# Patient Record
Sex: FEMALE | Race: WHITE
[De-identification: names, ages, dates, MRNs, and addresses within clinical notes are randomized per-mention and may not be internally consistent; named-entity substitution may affect disease eponyms.]

---

## 2017-05-23 ENCOUNTER — HOSPITAL ENCOUNTER (EMERGENCY)
Dept: HOSPITAL 8 - ED | Age: 27
LOS: 1 days | Discharge: HOME | End: 2017-05-24
Payer: MEDICAID

## 2017-05-23 VITALS — DIASTOLIC BLOOD PRESSURE: 62 MMHG | SYSTOLIC BLOOD PRESSURE: 117 MMHG

## 2017-05-23 DIAGNOSIS — J02.0: Primary | ICD-10-CM

## 2017-05-23 PROCEDURE — 99283 EMERGENCY DEPT VISIT LOW MDM: CPT

## 2018-03-13 ENCOUNTER — HOSPITAL ENCOUNTER (EMERGENCY)
Facility: MEDICAL CENTER | Age: 28
End: 2018-03-13
Attending: EMERGENCY MEDICINE
Payer: MEDICAID

## 2018-03-13 VITALS
WEIGHT: 176.59 LBS | SYSTOLIC BLOOD PRESSURE: 134 MMHG | DIASTOLIC BLOOD PRESSURE: 81 MMHG | HEART RATE: 83 BPM | RESPIRATION RATE: 16 BRPM | TEMPERATURE: 98.4 F | OXYGEN SATURATION: 95 %

## 2018-03-13 DIAGNOSIS — N30.00 ACUTE CYSTITIS WITHOUT HEMATURIA: ICD-10-CM

## 2018-03-13 LAB
APPEARANCE UR: CLEAR
BACTERIA #/AREA URNS HPF: ABNORMAL /HPF
BILIRUB UR QL STRIP.AUTO: NEGATIVE
CAOX CRY #/AREA URNS HPF: ABNORMAL /HPF
COLOR UR: YELLOW
CULTURE IF INDICATED INDCX: YES UA CULTURE
EPI CELLS #/AREA URNS HPF: ABNORMAL /HPF
GLUCOSE UR STRIP.AUTO-MCNC: NEGATIVE MG/DL
HCG UR QL: NEGATIVE
KETONES UR STRIP.AUTO-MCNC: ABNORMAL MG/DL
LEUKOCYTE ESTERASE UR QL STRIP.AUTO: ABNORMAL
MICRO URNS: ABNORMAL
NITRITE UR QL STRIP.AUTO: POSITIVE
PH UR STRIP.AUTO: 6 [PH]
PROT UR QL STRIP: NEGATIVE MG/DL
RBC # URNS HPF: ABNORMAL /HPF
RBC UR QL AUTO: ABNORMAL
SP GR UR REFRACTOMETRY: 1.02
SP GR UR STRIP.AUTO: 1.02
UROBILINOGEN UR STRIP.AUTO-MCNC: 0.2 MG/DL
WBC #/AREA URNS HPF: ABNORMAL /HPF

## 2018-03-13 PROCEDURE — A9270 NON-COVERED ITEM OR SERVICE: HCPCS | Performed by: EMERGENCY MEDICINE

## 2018-03-13 PROCEDURE — 81025 URINE PREGNANCY TEST: CPT

## 2018-03-13 PROCEDURE — 87077 CULTURE AEROBIC IDENTIFY: CPT

## 2018-03-13 PROCEDURE — 87186 SC STD MICRODIL/AGAR DIL: CPT

## 2018-03-13 PROCEDURE — 700102 HCHG RX REV CODE 250 W/ 637 OVERRIDE(OP): Performed by: EMERGENCY MEDICINE

## 2018-03-13 PROCEDURE — 99284 EMERGENCY DEPT VISIT MOD MDM: CPT

## 2018-03-13 PROCEDURE — 87086 URINE CULTURE/COLONY COUNT: CPT

## 2018-03-13 PROCEDURE — 81001 URINALYSIS AUTO W/SCOPE: CPT

## 2018-03-13 RX ORDER — SULFAMETHOXAZOLE AND TRIMETHOPRIM 800; 160 MG/1; MG/1
1 TABLET ORAL 2 TIMES DAILY
Qty: 10 TAB | Refills: 0 | Status: SHIPPED | OUTPATIENT
Start: 2018-03-13 | End: 2018-03-18

## 2018-03-13 RX ORDER — SULFAMETHOXAZOLE AND TRIMETHOPRIM 800; 160 MG/1; MG/1
1 TABLET ORAL ONCE
Status: COMPLETED | OUTPATIENT
Start: 2018-03-13 | End: 2018-03-13

## 2018-03-13 RX ADMIN — SULFAMETHOXAZOLE AND TRIMETHOPRIM 1 TABLET: 800; 160 TABLET ORAL at 19:57

## 2018-03-14 NOTE — ED TRIAGE NOTES
Patient states 2 days hx of worsening dysuria, frequency, and hesitancy. Patient denies fever/chills. Patient states unknown if she is pregnant, but does not think so. Patient states she has very irregular menses, but denies contraception use.

## 2018-03-14 NOTE — ED PROVIDER NOTES
ED Provider Note    Scribed for Ines Tate M.D. by Rosa Dorado. 3/13/2018, 7:33 PM.    Primary Care Provider: Pcp Pt States None  Means of arrival: walk in   History obtained from: patient   History limited by: none     CHIEF COMPLAINT  Chief Complaint   Patient presents with   • Painful Urination       HPI  Mae Wells is a 27 y.o. female who presents to the Emergency Department for evaluation of frequent urination onset 2 days ago. Patient confirms subjective fevers onset 5 days ago. She denies nausea and vomiting.       REVIEW OF SYSTEMS  Pertinent positives include frequent urination.   Pertinent negatives include no nausea, vomiting.    E.       PAST MEDICAL HISTORY  None noted       SOCIAL HISTORY  None noted       SURGICAL HISTORY  patient denies any surgical history       CURRENT MEDICATIONS  Home Medications    **Home medications have not yet been reviewed for this encounter**         ALLERGIES  No Known Allergies        PHYSICAL EXAM  VITAL SIGNS: /75   Pulse (!) 103   Temp 37.3 °C (99.2 °F) (Temporal)   Resp 16   Wt 80.1 kg (176 lb 9.4 oz)   SpO2 100%   Constitutional: Alert in no apparent distress. Well apearing  HENT: Normocephalic, Atraumatic, Bilateral external ears normal. Nose normal.   Eyes:  Conjunctiva normal, non-icteric.   Lungs: Non-labored respirations  Abdomen: Soft nontender nondistended. No CVA tenderness  Skin: Warm, Dry, No erythema, No rash.   Neurologic: Alert, Grossly non-focal.   Psychiatric: Affect normal, Judgment normal, Mood normal, Appears appropriate and not intoxicated.         LABS  Labs Reviewed   URINALYSIS,CULTURE IF INDICATED - Abnormal; Notable for the following:        Result Value    Ketones Trace (*)     Nitrite Positive (*)     Leukocyte Esterase Moderate (*)     Occult Blood Trace (*)     All other components within normal limits   URINE MICROSCOPIC (W/UA) - Abnormal; Notable for the following:     WBC  (*)     Bacteria  Many (*)     All other components within normal limits   HCG QUALITATIVE UR   REFRACTOMETER SG   URINE CULTURE(NEW)   All labs reviewed by me.        COURSE & MEDICAL DECISION MAKING  Pertinent Labs & Imaging studies reviewed. (See chart for details)    7:33 PM - Patient seen and examined at bedside. Patient will be treated with Bactrim 800-160 mg. Ordered Beta HCG Qualitative, urine microscopic, UA, refractometer and urine culture to evaluate her symptoms. Her urinalysis is consistent with UTI. She'll be given antibiotics. Patient agreed to discharge home with a prescription for an antibiotic. Encouraged Tylenol and Motrin for pain control.       The patient will return for new or worsening symptoms and is stable at the time of discharge. Patient was given return precautions. Patient and/or family member verbalizes understanding and will comply.      DISPOSITION:  Patient will be discharged home in stable condition.      FOLLOW UP:  [facility], Emergency Dept  [address] [facility] [address] [phone]    Return for worsening pain, vomiting, fever or other concerns      OUTPATIENT MEDICATIONS:  New Prescriptions    SULFAMETHOXAZOLE-TRIMETHOPRIM (BACTRIM DS) 800-160 MG TABLET    Take 1 Tab by mouth 2 times a day for 5 days.         FINAL IMPRESSION  1. Acute cystitis without hematuria        This dictation has been created using voice recognition software and/or scribes. The accuracy of the dictation is limited by the abilities of the software and the expertise of the scribes. I expect there may be some errors of grammar and possibly content. I made every attempt to manually correct the errors within my dictation. However, errors related to voice recognition software and/or scribes may still exist and should be interpreted within the appropriate context.    I, Rosa Dorado (Scribarmani), am scribing for, and in the presence of, Ines Tate M.D..  Electronically signed by: Rosa GARCIA  Estrada (Scribarmani), 3/13/2018  IInes M.D. personally performed the services described in this documentation, as scribed by Rosa Dorado in my presence, and it is both accurate and complete.    The note accurately reflects work and decisions made by me.  Ines Tate  3/13/2018  10:42 PM

## 2018-03-14 NOTE — ED NOTES
Patient dc'd to home w/ self. Patient alert and oriented x 4. Patient ambulatory w/ steady gait. Patient verbalizes understating of discharge instructions, follow up care, and prescription x 1.

## 2018-03-16 LAB
BACTERIA UR CULT: ABNORMAL
BACTERIA UR CULT: ABNORMAL
SIGNIFICANT IND 70042: ABNORMAL
SITE SITE: ABNORMAL
SOURCE SOURCE: ABNORMAL

## 2018-03-19 NOTE — ED NOTES
ED Positive Culture Follow-up/Notification Note:    Date: 3/19/18     Patient seen in the ED on 3/13/2018 for frequent urination x 2 days.   1. Acute cystitis without hematuria       Discharge Medication List as of 3/13/2018  7:59 PM      START taking these medications    Details   sulfamethoxazole-trimethoprim (BACTRIM DS) 800-160 MG tablet Take 1 Tab by mouth 2 times a day for 5 days., Disp-10 Tab, R-0, Print Rx Paper             Allergies: Patient has no known allergies.     Vitals:    03/13/18 1802 03/13/18 1810 03/13/18 2002   BP: 138/75  134/81   Pulse: (!) 103  83   Resp: 16  16   Temp: 37.3 °C (99.2 °F)  36.9 °C (98.4 °F)   TempSrc: Temporal     SpO2: 100%  95%   Weight:  80.1 kg (176 lb 9.4 oz)        Final cultures:   Results     Procedure Component Value Units Date/Time    URINE CULTURE(NEW) [[id number]]  (Abnormal)  (Susceptibility) Collected:  03/13/18 1925    Order Status:  Completed Specimen:  Urine Updated:  03/16/18 1008     Significant Indicator POS (POS)     Source UR     Site --     Urine Culture Mixed skin rafael 10-50,000 cfu/mL (A)      Escherichia coli  >100,000 cfu/mL   (A)    Culture & Susceptibility     ESCHERICHIA COLI     Antibiotic Sensitivity Microscan Unit Status    Ampicillin Resistant >16 mcg/mL Final    Cefepime Sensitive <=8 mcg/mL Final    Cefotaxime Sensitive <=2 mcg/mL Final    Cefotetan Sensitive <=16 mcg/mL Final    Ceftazidime Sensitive <=1 mcg/mL Final    Ceftriaxone Sensitive <=8 mcg/mL Final    Cefuroxime Sensitive <=4 mcg/mL Final    Cephalothin Intermediate 16 mcg/mL Final    Ciprofloxacin Sensitive <=1 mcg/mL Final    Gentamicin Sensitive <=4 mcg/mL Final    Levofloxacin Sensitive <=2 mcg/mL Final    Nitrofurantoin Sensitive <=32 mcg/mL Final    Pip/Tazobactam Sensitive <=16 mcg/mL Final    Piperacillin Sensitive <=16 mcg/mL Final    Tigecycline Sensitive <=2 mcg/mL Final    Tobramycin Sensitive <=4 mcg/mL Final    Trimeth/Sulfa Sensitive <=2/38 mcg/mL Final                        URINALYSIS,CULTURE IF INDICATED [[id number]]  (Abnormal) Collected:  03/13/18 1925    Order Status:  Completed Specimen:  Urine from Urine, Clean Catch Updated:  03/13/18 1932     Color Yellow     Character Clear     Specific Gravity 1.021     Ph 6.0     Glucose Negative mg/dL      Ketones Trace (A) mg/dL      Protein Negative mg/dL      Bilirubin Negative     Urobilinogen, Urine 0.2     Nitrite Positive (A)     Leukocyte Esterase Moderate (A)     Occult Blood Trace (A)     Micro Urine Req Microscopic     Culture Indicated Yes UA Culture           Plan:   Appropriate antibiotic therapy prescribed. No changes required based upon culture result.      Devora Younger

## 2018-03-24 ENCOUNTER — HOSPITAL ENCOUNTER (INPATIENT)
Dept: HOSPITAL 8 - ED | Age: 28
LOS: 1 days | Discharge: HOME | DRG: 917 | End: 2018-03-25
Attending: INTERNAL MEDICINE | Admitting: INTERNAL MEDICINE
Payer: MEDICAID

## 2018-03-24 VITALS — DIASTOLIC BLOOD PRESSURE: 80 MMHG | SYSTOLIC BLOOD PRESSURE: 123 MMHG

## 2018-03-24 VITALS — WEIGHT: 182.76 LBS | HEIGHT: 64 IN | BODY MASS INDEX: 31.2 KG/M2

## 2018-03-24 VITALS — DIASTOLIC BLOOD PRESSURE: 80 MMHG | SYSTOLIC BLOOD PRESSURE: 127 MMHG

## 2018-03-24 VITALS — SYSTOLIC BLOOD PRESSURE: 118 MMHG | DIASTOLIC BLOOD PRESSURE: 79 MMHG

## 2018-03-24 DIAGNOSIS — T40.1X1A: Primary | ICD-10-CM

## 2018-03-24 DIAGNOSIS — D72.825: ICD-10-CM

## 2018-03-24 DIAGNOSIS — E86.0: ICD-10-CM

## 2018-03-24 DIAGNOSIS — F17.200: ICD-10-CM

## 2018-03-24 DIAGNOSIS — D72.829: ICD-10-CM

## 2018-03-24 DIAGNOSIS — F60.3: ICD-10-CM

## 2018-03-24 DIAGNOSIS — F32.9: ICD-10-CM

## 2018-03-24 DIAGNOSIS — F11.10: ICD-10-CM

## 2018-03-24 DIAGNOSIS — F41.9: ICD-10-CM

## 2018-03-24 DIAGNOSIS — J96.01: ICD-10-CM

## 2018-03-24 LAB
<PLATELET ESTIMATE>: ADEQUATE
<PLT MORPHOLOGY>: (no result)
ALBUMIN SERPL-MCNC: 4.2 G/DL (ref 3.4–5)
ANION GAP SERPL CALC-SCNC: 10 MMOL/L (ref 5–15)
APAP SERPL-MCNC: < 2 MCG/ML (ref 10–30)
BAND#(MANUAL): 2.31 X10^3/UL
BILIRUB DIRECT SERPL-MCNC: NORMAL MG/DL
CALCIUM SERPL-MCNC: 8.8 MG/DL (ref 8.5–10.1)
CHLORIDE SERPL-SCNC: 105 MMOL/L (ref 98–107)
CREAT SERPL-MCNC: 0.74 MG/DL (ref 0.55–1.02)
CULTURE INDICATED?: NO
ERYTHROCYTE [DISTWIDTH] IN BLOOD BY AUTOMATED COUNT: 12.8 % (ref 9.6–15.2)
LYMPH#(MANUAL): 4.39 X10^3/UL (ref 1–3.4)
LYMPHS% (MANUAL): 19 % (ref 22–44)
MCH RBC QN AUTO: 32.3 PG (ref 27–34.8)
MCHC RBC AUTO-ENTMCNC: 34.5 G/DL (ref 32.4–35.8)
MCV RBC AUTO: 93.7 FL (ref 80–100)
MD: YES
MICROSCOPIC: (no result)
MONOS#(MANUAL): 0.46 X10^3/UL (ref 0.3–2.7)
MONOS% (MANUAL): 2 % (ref 2–9)
NEUTS BAND NFR BLD: 10 % (ref 0–7)
PLATELET # BLD AUTO: 308 X10^3/UL (ref 130–400)
PMV BLD AUTO: 7.7 FL (ref 7.4–10.4)
RBC # BLD AUTO: 5.28 X10^6/UL (ref 3.82–5.3)
SEG#(MANUAL): 15.94 X10^3/UL (ref 1.8–6.8)
SEGS% (MANUAL): 69 % (ref 42–75)
VANCOMYCIN TROUGH SERPL-MCNC: < 1.7 MG/DL (ref 2.8–20)

## 2018-03-24 PROCEDURE — 84703 CHORIONIC GONADOTROPIN ASSAY: CPT

## 2018-03-24 PROCEDURE — 36415 COLL VENOUS BLD VENIPUNCTURE: CPT

## 2018-03-24 PROCEDURE — 83605 ASSAY OF LACTIC ACID: CPT

## 2018-03-24 PROCEDURE — 71045 X-RAY EXAM CHEST 1 VIEW: CPT

## 2018-03-24 PROCEDURE — 84145 PROCALCITONIN (PCT): CPT

## 2018-03-24 PROCEDURE — 80329 ANALGESICS NON-OPIOID 1 OR 2: CPT

## 2018-03-24 PROCEDURE — 87040 BLOOD CULTURE FOR BACTERIA: CPT

## 2018-03-24 PROCEDURE — 82040 ASSAY OF SERUM ALBUMIN: CPT

## 2018-03-24 PROCEDURE — 85025 COMPLETE CBC W/AUTO DIFF WBC: CPT

## 2018-03-24 PROCEDURE — 81003 URINALYSIS AUTO W/O SCOPE: CPT

## 2018-03-24 PROCEDURE — G0480 DRUG TEST DEF 1-7 CLASSES: HCPCS

## 2018-03-24 PROCEDURE — 96374 THER/PROPH/DIAG INJ IV PUSH: CPT

## 2018-03-24 PROCEDURE — 80053 COMPREHEN METABOLIC PANEL: CPT

## 2018-03-24 PROCEDURE — 80307 DRUG TEST PRSMV CHEM ANLYZR: CPT

## 2018-03-24 PROCEDURE — 80048 BASIC METABOLIC PNL TOTAL CA: CPT

## 2018-03-24 RX ADMIN — PROPRANOLOL HYDROCHLORIDE SCH MG: 20 TABLET ORAL at 10:17

## 2018-03-24 RX ADMIN — NICOTINE SCH PATCH: 14 PATCH, EXTENDED RELEASE TRANSDERMAL at 12:02

## 2018-03-24 RX ADMIN — PROPRANOLOL HYDROCHLORIDE SCH MG: 20 TABLET ORAL at 21:03

## 2018-03-24 RX ADMIN — NICOTINE SCH PATCH: 14 PATCH, EXTENDED RELEASE TRANSDERMAL at 08:30

## 2018-03-25 VITALS — SYSTOLIC BLOOD PRESSURE: 118 MMHG | DIASTOLIC BLOOD PRESSURE: 73 MMHG

## 2018-03-25 VITALS — SYSTOLIC BLOOD PRESSURE: 103 MMHG | DIASTOLIC BLOOD PRESSURE: 65 MMHG

## 2018-03-25 LAB
ALBUMIN SERPL-MCNC: 3.3 G/DL (ref 3.4–5)
ALP SERPL-CCNC: 93 U/L (ref 45–117)
ALT SERPL-CCNC: 31 U/L (ref 12–78)
ANION GAP SERPL CALC-SCNC: 6 MMOL/L (ref 5–15)
BASOPHILS # BLD AUTO: 0.05 X10^3/UL (ref 0–0.1)
BASOPHILS NFR BLD AUTO: 1 % (ref 0–1)
BILIRUB SERPL-MCNC: 0.6 MG/DL (ref 0.2–1)
CALCIUM SERPL-MCNC: 8.3 MG/DL (ref 8.5–10.1)
CHLORIDE SERPL-SCNC: 106 MMOL/L (ref 98–107)
CREAT SERPL-MCNC: 0.63 MG/DL (ref 0.55–1.02)
EOSINOPHIL # BLD AUTO: 0.56 X10^3/UL (ref 0–0.4)
EOSINOPHIL NFR BLD AUTO: 6 % (ref 1–7)
ERYTHROCYTE [DISTWIDTH] IN BLOOD BY AUTOMATED COUNT: 12.6 % (ref 9.6–15.2)
LYMPHOCYTES # BLD AUTO: 2.89 X10^3/UL (ref 1–3.4)
LYMPHOCYTES NFR BLD AUTO: 29 % (ref 22–44)
MCH RBC QN AUTO: 32.2 PG (ref 27–34.8)
MCHC RBC AUTO-ENTMCNC: 34.1 G/DL (ref 32.4–35.8)
MCV RBC AUTO: 94.3 FL (ref 80–100)
MD: NO
MONOCYTES # BLD AUTO: 0.68 X10^3/UL (ref 0.2–0.8)
MONOCYTES NFR BLD AUTO: 7 % (ref 2–9)
NEUTROPHILS # BLD AUTO: 5.74 X10^3/UL (ref 1.8–6.8)
NEUTROPHILS NFR BLD AUTO: 58 % (ref 42–75)
PLATELET # BLD AUTO: 260 X10^3/UL (ref 130–400)
PMV BLD AUTO: 7.8 FL (ref 7.4–10.4)
PROT SERPL-MCNC: 6.9 G/DL (ref 6.4–8.2)
RBC # BLD AUTO: 4.41 X10^6/UL (ref 3.82–5.3)

## 2018-03-25 RX ADMIN — PROPRANOLOL HYDROCHLORIDE SCH MG: 20 TABLET ORAL at 10:09

## 2018-03-25 RX ADMIN — NICOTINE SCH PATCH: 14 PATCH, EXTENDED RELEASE TRANSDERMAL at 10:09

## 2019-07-19 ENCOUNTER — HOSPITAL ENCOUNTER (EMERGENCY)
Facility: MEDICAL CENTER | Age: 29
End: 2019-07-19
Attending: EMERGENCY MEDICINE
Payer: MEDICAID

## 2019-07-19 VITALS
OXYGEN SATURATION: 97 % | HEART RATE: 70 BPM | SYSTOLIC BLOOD PRESSURE: 138 MMHG | DIASTOLIC BLOOD PRESSURE: 76 MMHG | WEIGHT: 168.65 LBS | RESPIRATION RATE: 16 BRPM | TEMPERATURE: 98.2 F

## 2019-07-19 DIAGNOSIS — B96.89 BV (BACTERIAL VAGINOSIS): ICD-10-CM

## 2019-07-19 DIAGNOSIS — N76.0 BV (BACTERIAL VAGINOSIS): ICD-10-CM

## 2019-07-19 DIAGNOSIS — R10.2 VAGINAL PAIN: ICD-10-CM

## 2019-07-19 LAB
APPEARANCE UR: CLEAR
BACTERIA #/AREA URNS HPF: NEGATIVE /HPF
BACTERIA GENITAL QL WET PREP: NORMAL
BILIRUB UR QL STRIP.AUTO: NEGATIVE
COLOR UR: YELLOW
EPI CELLS #/AREA URNS HPF: NEGATIVE /HPF
GLUCOSE UR STRIP.AUTO-MCNC: NEGATIVE MG/DL
HCG UR QL: NEGATIVE
HYALINE CASTS #/AREA URNS LPF: ABNORMAL /LPF
KETONES UR STRIP.AUTO-MCNC: ABNORMAL MG/DL
LEUKOCYTE ESTERASE UR QL STRIP.AUTO: NEGATIVE
MICRO URNS: ABNORMAL
NITRITE UR QL STRIP.AUTO: NEGATIVE
PH UR STRIP.AUTO: 5.5 [PH]
PROT UR QL STRIP: NEGATIVE MG/DL
RBC # URNS HPF: ABNORMAL /HPF
RBC UR QL AUTO: ABNORMAL
SIGNIFICANT IND 70042: NORMAL
SITE SITE: NORMAL
SOURCE SOURCE: NORMAL
SP GR UR REFRACTOMETRY: 1.01
UROBILINOGEN UR STRIP.AUTO-MCNC: 0.2 MG/DL
WBC #/AREA URNS HPF: ABNORMAL /HPF

## 2019-07-19 PROCEDURE — 96372 THER/PROPH/DIAG INJ SC/IM: CPT

## 2019-07-19 PROCEDURE — 700102 HCHG RX REV CODE 250 W/ 637 OVERRIDE(OP): Performed by: EMERGENCY MEDICINE

## 2019-07-19 PROCEDURE — 81025 URINE PREGNANCY TEST: CPT

## 2019-07-19 PROCEDURE — A9270 NON-COVERED ITEM OR SERVICE: HCPCS | Performed by: EMERGENCY MEDICINE

## 2019-07-19 PROCEDURE — 87591 N.GONORRHOEAE DNA AMP PROB: CPT

## 2019-07-19 PROCEDURE — 81001 URINALYSIS AUTO W/SCOPE: CPT

## 2019-07-19 PROCEDURE — 700101 HCHG RX REV CODE 250

## 2019-07-19 PROCEDURE — 99284 EMERGENCY DEPT VISIT MOD MDM: CPT

## 2019-07-19 PROCEDURE — 87491 CHLMYD TRACH DNA AMP PROBE: CPT

## 2019-07-19 PROCEDURE — 700111 HCHG RX REV CODE 636 W/ 250 OVERRIDE (IP): Performed by: EMERGENCY MEDICINE

## 2019-07-19 RX ORDER — METRONIDAZOLE 500 MG/1
500 TABLET ORAL 3 TIMES DAILY
Qty: 30 TAB | Refills: 0 | Status: SHIPPED | OUTPATIENT
Start: 2019-07-19 | End: 2019-07-29

## 2019-07-19 RX ORDER — METRONIDAZOLE 500 MG/1
500 TABLET ORAL ONCE
Status: COMPLETED | OUTPATIENT
Start: 2019-07-19 | End: 2019-07-19

## 2019-07-19 RX ORDER — CEFTRIAXONE SODIUM 250 MG/1
250 INJECTION, POWDER, FOR SOLUTION INTRAMUSCULAR; INTRAVENOUS ONCE
Status: COMPLETED | OUTPATIENT
Start: 2019-07-19 | End: 2019-07-19

## 2019-07-19 RX ORDER — AZITHROMYCIN 250 MG/1
1000 TABLET, FILM COATED ORAL ONCE
Status: COMPLETED | OUTPATIENT
Start: 2019-07-19 | End: 2019-07-19

## 2019-07-19 RX ADMIN — CEFTRIAXONE SODIUM 250 MG: 250 INJECTION, POWDER, FOR SOLUTION INTRAMUSCULAR; INTRAVENOUS at 21:26

## 2019-07-19 RX ADMIN — LIDOCAINE HYDROCHLORIDE 1 ML: 10 INJECTION, SOLUTION INFILTRATION; PERINEURAL at 21:45

## 2019-07-19 RX ADMIN — AZITHROMYCIN 1000 MG: 250 TABLET, FILM COATED ORAL at 21:27

## 2019-07-19 RX ADMIN — METRONIDAZOLE 500 MG: 500 TABLET, FILM COATED ORAL at 22:01

## 2019-07-19 ASSESSMENT — PAIN DESCRIPTION - DESCRIPTORS: DESCRIPTORS: SORE

## 2019-07-19 ASSESSMENT — ENCOUNTER SYMPTOMS
VOMITING: 1
HEADACHES: 0
DIZZINESS: 0
ABDOMINAL PAIN: 1
NAUSEA: 1
FEVER: 0

## 2019-07-19 NOTE — LETTER
7/22/2019               Mae Wells  [address] [place] [id number]        Dear Mae (MR#[id number])    As we have been unable to contact you by phone, this letter is sent in regards to your, recent visit to the [facility] Emergency Department on 7/19/2019.  During the visit, tests were performed to assist the physician in a medical diagnosis.  A review of those tests requires that we notify you of the following:    Your culture test was positive for Chlamydia, a sexually transmitted infection. This was already treated appropriately in the Emergency Department. .       Based on the above findings it is recommended that you seek testing for the presence of additional sexually transmitted infections from the Health Department. Also, it is advised that you inform your sexual partner(s) within the previous 60 days of the above findings and direct them to the Health Department for testing. Should your symptoms progress, it is important that you follow up with your primary care physician, your local urgent care office, or return to the emergency department for further work up in order to prevent long term health issues.      Thank you for your cooperation in the matter.    Sincerely,  ED Culture Follow-Up Staff  Devora Younger, PharmD    [facility] [place] [facility], Emergency Department  [address] [place], [place] [address]  [phone] (ED Culture Line)  [phone]

## 2019-07-20 LAB
C TRACH DNA SPEC QL NAA+PROBE: POSITIVE
N GONORRHOEA DNA SPEC QL NAA+PROBE: NEGATIVE
SPECIMEN SOURCE: ABNORMAL

## 2019-07-20 NOTE — ED TRIAGE NOTES
"Mae Wells  28 y.o. female  Chief Complaint   Patient presents with   • Vaginal Pain     \"I have had a yeast infection before, and UTI before but it doesn't feel like either. I am kind of worried about STD's and I had my labs drawn for that this morning but I don't have the results.\"   • Vaginal Bleeding     Pt reports increased spotting, pt states \"it feels different than a regular period.\"       Pt amb to triage with steady gait for above complaint. Pt denies SI/HI but reports previous attempt over a year ago.   Pt is alert and oriented, speaking in full sentences, follows commands and responds appropriately to questions. Resp are even and unlabored. No behavioral indicators of pain.   Pt placed in lobby. Pt educated on triage process. Pt encouraged to alert staff for any changes.    "

## 2019-07-20 NOTE — ED NOTES
Pt given discharge instructions. Medication education provided. Signed copy in chart. Pt states all belongings in possession. Pt ambulatory off unit with steady gait.

## 2019-07-20 NOTE — ED PROVIDER NOTES
"ED Provider Note    Means of arrival: private  History obtained from: patient  History limited by: none    CHIEF COMPLAINT  Chief Complaint   Patient presents with   • Vaginal Pain     \"I have had a yeast infection before, and UTI before but it doesn't feel like either. I am kind of worried about STD's and I had my labs drawn for that this morning but I don't have the results.\"   • Vaginal Bleeding       HPI  Mae Wells is a 28 y.o. female who presents to the Emergency Department for vaginal pain.  Patient reports that over the last couple days she has noted vaginal pain and mild discomfort at the introitus of the vagina.  She has had a new partner and is concerned about STD exposure.  Patient is currently on her menstrual cycle and therefore does not believe she is pregnant.  Denies vaginal discharge, abdominal pain, nausea, vomiting, and fevers.    REVIEW OF SYSTEMS  Review of Systems   Constitutional: Negative for fever.   Gastrointestinal: Positive for abdominal pain, nausea and vomiting.   Genitourinary: Negative for dysuria.        Positive for vaginal pain   Neurological: Negative for dizziness and headaches.   All other systems reviewed and are negative.      PAST MEDICAL HISTORY   None    SURGICAL HISTORY  patient denies any surgical history    SOCIAL HISTORY  Social History   Substance Use Topics   • Smoking status: Current Every Day Smoker     Packs/day: 0.50     Types: Cigarettes   • Smokeless tobacco: Never Used   • Alcohol use Yes      Comment: 4 nights out of the week      History   Drug Use No       FAMILY HISTORY  History reviewed. No pertinent family history.    CURRENT MEDICATIONS  Home Medications    none         ALLERGIES  No Known Allergies    PHYSICAL EXAM  VITAL SIGNS: /77   Pulse 74   Temp 36.8 °C (98.2 °F) (Temporal)   Resp 18   Wt 76.5 kg (168 lb 10.4 oz)   SpO2 94%   Vitals reviewed by myself.  Physical Exam   Constitutional: She is oriented to person, place, and " time and well-developed, well-nourished, and in no distress. No distress.   HENT:   Head: Normocephalic.   Eyes: EOM are normal.   Neck: Normal range of motion.   Cardiovascular: Normal rate and regular rhythm.    Pulmonary/Chest: Effort normal and breath sounds normal.   Abdominal: Soft. She exhibits no distension. There is no tenderness.   Genitourinary:   Genitourinary Comments: Normal external genitalia.  No lesions noted on the external genitalia.  Cervix is pink and nonfriable without any lesions.  Scant bleeding is noted from the cervical os.   Musculoskeletal: Normal range of motion.   Neurological: She is alert and oriented to person, place, and time.   Skin: Skin is warm and dry.         DIAGNOSTIC STUDIES /  LABS  Labs Reviewed   URINALYSIS,CULTURE IF INDICATED - Abnormal; Notable for the following:        Result Value    Ketones Trace (*)     Occult Blood Large (*)     All other components within normal limits   URINE MICROSCOPIC (W/UA) - Abnormal; Notable for the following:     RBC 20-50 (*)     All other components within normal limits   HCG QUALITATIVE UR   WET PREP    Narrative:     Specimen received: Swab in approximately 6 cc saline  Optimal specimen: Swab in 1/2 to 2 cc of saline.   CHLAMYDIA/GC PCR URINE OR SWAB   REFRACTOMETER SG       All labs reviewed by me.        COURSE & MEDICAL DECISION MAKING  Nursing notes, VS, PMSFHx reviewed in chart.    Patient is a 28-year old female who comes in for vaginal pain.  Differential diagnosis yeast infection, urinary tract infection, STD exposure, bacterial vaginosis.  Diagnostic work-up includes urinalysis and wet prep.    Patient's initial vitals are within normal limits.  As patient is concerned about STD with new sexual partner I advised her that we should empirically treat her for STD to which she is agreeable.  She is treated with IM ceftriaxone and oral azithromycin.  Patient's urinalysis returns and demonstrates no signs of infection, there is  blood in her urine which is likely from her menstrual cycle.  Pregnancy test is negative.  Wet prep is consistent with bacterial vaginosis, therefore she is started on metronidazole.  Patient is advised on symptomatic management of bacterial vaginosis and given strict return precautions.  She is then discharged home in stable condition.      FINAL IMPRESSION  1. Vaginal pain    2. BV (bacterial vaginosis)

## 2019-07-20 NOTE — ED NOTES
Pt to P81, agree with triage rn note. Pt instructed to change into gown.  Pt reports pain/discomfort around vaginal opening.

## 2019-07-22 NOTE — ED NOTES
ED Positive Culture Follow-up/Notification Note:    Date: 7/22/19      Patient seen in the ED on 7/19/2019 for vaginal pain and possible STD exposure.   1. Vaginal pain    2. BV (bacterial vaginosis)     Given Azithromycin 1 g PO and Ceftriaxone 250 mg IM in the ER.     Discharge Medication List as of 7/19/2019  9:53 PM      START taking these medications    Details   metroNIDAZOLE (FLAGYL) 500 MG Tab Take 1 Tab by mouth 3 times a day for 10 days., Disp-30 Tab, R-0, Print Rx Paper             Allergies: Patient has no known allergies.     Vitals:    07/19/19 2009 07/19/19 2200   BP: 134/77 138/76   Pulse: 74 70   Resp: 18 16   Temp: 36.8 °C (98.2 °F)    TempSrc: Temporal    SpO2: 94% 97%   Weight: 76.5 kg (168 lb 10.4 oz)        Final cultures:   Results     Procedure Component Value Units Date/Time    Chlamydia/GC PCR Urine Or Swab [[id number]]  (Abnormal) Collected:  07/19/19 2110    Order Status:  Completed Specimen:  Urine from Genital Updated:  07/20/19 2302     C. trachomatis by PCR POSITIVE (A)     N. gonorrhoeae by PCR Negative     Source Urine    Narrative:       ER tel.  07/20/2019, 23:02, RESULTS CALLED TO: EXT [id number]    WET PREP [[id number]] Collected:  07/19/19 2110    Order Status:  Completed Specimen:  Genital from Cervical Updated:  07/19/19 2136     Significant Indicator NEG     Source GEN     Site CERVICAL     Wet Prep For Parasites Few WBCs seen.  Few clue cells seen.  No motile Trichomonas seen.  No yeast.      Narrative:       Specimen received: Swab in approximately 6 cc saline  Optimal specimen: Swab in 1/2 to 2 cc of saline.    URINALYSIS,CULTURE IF INDICATED [[id number]]  (Abnormal) Collected:  07/19/19 2110    Order Status:  Completed Specimen:  Urine Updated:  07/19/19 2126     Color Yellow     Character Clear     Ph 5.5     Glucose Negative mg/dL      Ketones Trace (A) mg/dL      Protein Negative mg/dL      Bilirubin Negative     Urobilinogen, Urine 0.2     Nitrite Negative     Leukocyte  Esterase Negative     Occult Blood Large (A)     Micro Urine Req Microscopic          Plan:   Appropriate antibiotic therapy prescribed upon discharge for BV and given in the ED for Chlamydia. No further treatment required.   Attempted to contact the patient to notify of result, but there was no answer. Left a message to return call for results.   Will also send a letter to notify of results and encourage follow up with partners and the Health Dept.     Devora Younger    ADDENDUM: 7/22/19 10:35  Patient returned call. Informed of results. States that she is having further testing done with her PCP. She is aware of the need to inform partners and remain abstinent for the 7 days post treatment. Encouraged compliance with metronidazole.    Devora Younger, PharmD

## 2019-09-09 ENCOUNTER — HOSPITAL ENCOUNTER (EMERGENCY)
Facility: MEDICAL CENTER | Age: 29
End: 2019-09-10
Attending: EMERGENCY MEDICINE
Payer: MEDICAID

## 2019-09-09 DIAGNOSIS — N92.1 MENORRHAGIA WITH IRREGULAR CYCLE: ICD-10-CM

## 2019-09-09 DIAGNOSIS — Z20.2 STD EXPOSURE: ICD-10-CM

## 2019-09-09 LAB
APPEARANCE UR: CLEAR
BACTERIA #/AREA URNS HPF: ABNORMAL /HPF
BILIRUB UR QL STRIP.AUTO: NEGATIVE
COLOR UR: ABNORMAL
EPI CELLS #/AREA URNS HPF: ABNORMAL /HPF
GLUCOSE UR STRIP.AUTO-MCNC: NEGATIVE MG/DL
HCG UR QL: NEGATIVE
HYALINE CASTS #/AREA URNS LPF: ABNORMAL /LPF
KETONES UR STRIP.AUTO-MCNC: 15 MG/DL
LEUKOCYTE ESTERASE UR QL STRIP.AUTO: NEGATIVE
MICRO URNS: ABNORMAL
NITRITE UR QL STRIP.AUTO: NEGATIVE
PH UR STRIP.AUTO: 7 [PH] (ref 5–8)
PROT UR QL STRIP: NEGATIVE MG/DL
RBC # URNS HPF: ABNORMAL /HPF
RBC UR QL AUTO: ABNORMAL
SP GR UR STRIP.AUTO: 1.03
UROBILINOGEN UR STRIP.AUTO-MCNC: 1 MG/DL
WBC #/AREA URNS HPF: ABNORMAL /HPF

## 2019-09-09 PROCEDURE — 81001 URINALYSIS AUTO W/SCOPE: CPT

## 2019-09-09 PROCEDURE — 700111 HCHG RX REV CODE 636 W/ 250 OVERRIDE (IP): Performed by: EMERGENCY MEDICINE

## 2019-09-09 PROCEDURE — 81025 URINE PREGNANCY TEST: CPT

## 2019-09-09 PROCEDURE — 87491 CHLMYD TRACH DNA AMP PROBE: CPT

## 2019-09-09 PROCEDURE — 87591 N.GONORRHOEAE DNA AMP PROB: CPT

## 2019-09-09 PROCEDURE — A9270 NON-COVERED ITEM OR SERVICE: HCPCS | Performed by: EMERGENCY MEDICINE

## 2019-09-09 PROCEDURE — 87510 GARDNER VAG DNA DIR PROBE: CPT

## 2019-09-09 PROCEDURE — 87660 TRICHOMONAS VAGIN DIR PROBE: CPT

## 2019-09-09 PROCEDURE — 96372 THER/PROPH/DIAG INJ SC/IM: CPT

## 2019-09-09 PROCEDURE — 99284 EMERGENCY DEPT VISIT MOD MDM: CPT

## 2019-09-09 PROCEDURE — 700102 HCHG RX REV CODE 250 W/ 637 OVERRIDE(OP): Performed by: EMERGENCY MEDICINE

## 2019-09-09 PROCEDURE — 87480 CANDIDA DNA DIR PROBE: CPT

## 2019-09-09 PROCEDURE — 700101 HCHG RX REV CODE 250: Performed by: EMERGENCY MEDICINE

## 2019-09-09 RX ORDER — CEFTRIAXONE SODIUM 250 MG/1
250 INJECTION, POWDER, FOR SOLUTION INTRAMUSCULAR; INTRAVENOUS ONCE
Status: COMPLETED | OUTPATIENT
Start: 2019-09-09 | End: 2019-09-09

## 2019-09-09 RX ORDER — ONDANSETRON 4 MG/1
4 TABLET, ORALLY DISINTEGRATING ORAL ONCE
Status: COMPLETED | OUTPATIENT
Start: 2019-09-09 | End: 2019-09-09

## 2019-09-09 RX ORDER — AZITHROMYCIN 250 MG/1
1000 TABLET, FILM COATED ORAL ONCE
Status: COMPLETED | OUTPATIENT
Start: 2019-09-09 | End: 2019-09-09

## 2019-09-09 RX ADMIN — CEFTRIAXONE SODIUM 250 MG: 250 INJECTION, POWDER, FOR SOLUTION INTRAMUSCULAR; INTRAVENOUS at 23:27

## 2019-09-09 RX ADMIN — LIDOCAINE HYDROCHLORIDE 1 ML: 10 INJECTION, SOLUTION INFILTRATION; PERINEURAL at 23:27

## 2019-09-09 RX ADMIN — ONDANSETRON 4 MG: 4 TABLET, ORALLY DISINTEGRATING ORAL at 23:27

## 2019-09-09 RX ADMIN — AZITHROMYCIN 1000 MG: 250 TABLET, FILM COATED ORAL at 23:27

## 2019-09-09 SDOH — HEALTH STABILITY: MENTAL HEALTH: HOW OFTEN DO YOU HAVE A DRINK CONTAINING ALCOHOL?: 4 OR MORE TIMES A WEEK

## 2019-09-09 SDOH — HEALTH STABILITY: MENTAL HEALTH: HOW MANY STANDARD DRINKS CONTAINING ALCOHOL DO YOU HAVE ON A TYPICAL DAY?: 5 OR 6

## 2019-09-09 SDOH — HEALTH STABILITY: MENTAL HEALTH: HOW OFTEN DO YOU HAVE 6 OR MORE DRINKS ON ONE OCCASION?: LESS THAN MONTHLY

## 2019-09-09 NOTE — LETTER
9/13/2019               Mae Wells  [address] [place] [id number]        Dear Mae (MR#[id number])    As we have been unable to contact you by phone, this letter is sent in regards to your, recent visit to the [facility] Emergency Department on 9/9/2019.  During the visit, tests were performed to assist the physician in a medical diagnosis.  A review of those tests requires that we notify you of the following:    Your culture test was positive for Gonorrhea, a sexually transmitted infection. This was already treated appropriately in the Emergency Department.        Based on the above findings it is recommended that you seek testing for the presence of additional sexually transmitted infections from the Health Department. Also, it is advised that you inform your sexual partner(s) within the previous 60 days of the above findings and direct them to the Health Department for testing. Should your symptoms progress, it is important that you follow up with your primary care physician, your local urgent care office, or return to the emergency department for further work up in order to prevent long term health issues.      Thank you for your cooperation in the matter.    Sincerely,  ED Culture Follow-Up Staff  Devora Younger, PharmD    [facility], Emergency Department  [address] [place], [place] [address]  [phone] (ED Culture Line)  [phone]

## 2019-09-10 VITALS
SYSTOLIC BLOOD PRESSURE: 125 MMHG | DIASTOLIC BLOOD PRESSURE: 73 MMHG | OXYGEN SATURATION: 99 % | RESPIRATION RATE: 16 BRPM | WEIGHT: 160.72 LBS | TEMPERATURE: 97.7 F | HEART RATE: 85 BPM | HEIGHT: 64 IN | BODY MASS INDEX: 27.44 KG/M2

## 2019-09-10 LAB
C TRACH DNA SPEC QL NAA+PROBE: NEGATIVE
CANDIDA DNA VAG QL PROBE+SIG AMP: NEGATIVE
G VAGINALIS DNA VAG QL PROBE+SIG AMP: NEGATIVE
N GONORRHOEA DNA SPEC QL NAA+PROBE: POSITIVE
SPECIMEN SOURCE: ABNORMAL
T VAGINALIS DNA VAG QL PROBE+SIG AMP: NEGATIVE

## 2019-09-10 NOTE — ED NOTES
Pt educated on safe sex and follow up instructions. Pt instructed to make sure partners are treated before engaging in sexual intercourse. Pt verbalized understanding. Pt ambulated to lobby with steady gait

## 2019-09-10 NOTE — ED PROVIDER NOTES
"ED Provider Note    Scribed for Marques Zarate M.D. by Mervin Chase. 9/9/2019, 9:45 PM.    Primary care provider: None noted.  Means of arrival: walk-in  History obtained from: patient  History limited by: none    CHIEF COMPLAINT  Chief Complaint   Patient presents with   • Exposure to STD     known exposure to chlamydia, denies discharge   • Menstrual Problem     \"cramping more than usual\"       HPI  Mae Wells is a 28 y.o. female who presents to the Emergency Department for evaluation of a possible sexually transmitted diseases exposure. The patient reports that she recently had sexual intercourse with an individual who she found out had chlamydia. She reports being treated for chlamydia after learning of her exposure to this sexually transmitted illness, but notes that she had sexual intercourse again with the same individual who did not treat their chlamydia. Thus, the patient believes that she has been exposed to chlamydia again by the same individual. She reports that her regular menstrual period started earlier than usual this month and states that it is more painful than usual. She reports associated moderate lower abdominal cramping and lower back pain but denies any associated dysuria, burning with urination, vaginal ulcers or sores, or vaginal discharge. Additionally, no alleviating or exacerbating factors were identified for the patient's lower abdominal pain. The patient denies any prior history of STD exposure with the exception of her recent chlamydia exposure. She also denies any concern of HIV and notes that she was tested negative 1.5 months ago. The patient does not believe she is currently pregnant.    REVIEW OF SYSTEMS  Pertinent positives include irregular menstruation, lower abdominal cramping, lower back pain. Pertinent negatives include dysuria, vaginal ulcers or sores, vaginal discharge. All other systems negative.     PAST MEDICAL HISTORY  Patient has a previous " "medical history of chlamydia.    SURGICAL HISTORY  patient denies any surgical history    SOCIAL HISTORY  Social History     Tobacco Use   • Smoking status: Current Every Day Smoker     Packs/day: 0.50     Types: Cigarettes   • Smokeless tobacco: Never Used   Substance Use Topics   • Alcohol use: Yes     Frequency: 4 or more times a week     Drinks per session: 5 or 6     Binge frequency: Less than monthly     Comment: 4 nights out of the week   • Drug use: No      Social History     Substance and Sexual Activity   Drug Use No       FAMILY HISTORY  History reviewed. No pertinent family history.    CURRENT MEDICATIONS  No current facility-administered medications for this encounter.     Current Outpatient Medications:   •  Ibuprofen (ADVIL) 200 MG CAPS, Take  by mouth., Disp: , Rfl:   •  oxycodone-acetaminophen (PERCOCET) 5-325 MG TABS, Take 1-2 Tabs by mouth every four hours as needed for Mild Pain., Disp: 20 Each, Rfl: 0    ALLERGIES  No Known Allergies    PHYSICAL EXAM  VITAL SIGNS: /99   Pulse (!) 127   Temp 35.9 °C (96.6 °F) (Temporal)   Resp 16   Ht 1.613 m (5' 3.5\")   Wt 72.9 kg (160 lb 11.5 oz)   LMP 09/07/2019 (Exact Date)   SpO2 98%   Breastfeeding? No   BMI 28.02 kg/m²     Constitutional: Well developed, Well nourished, no distress.   Eyes: Conjunctiva normal, No discharge.   Cardiovascular: Normal heart rate, Normal rhythm, No murmurs, equal pulses.   Pulmonary: Normal breath sounds, No respiratory distress, No wheezing, No rales, No rhonchi.  Abdomen: Minor suprapubic tenderness. Soft, No tenderness, No masses, no rebound, no guarding.   Pelvic: Chaperoned by female nurse. Normal external genitalia. Small amount of dark red blood in vaginal vault. No adnexal tenderness or masses. No cervical motion tenderness.   Musculoskeletal: No major deformities noted, No tenderness.   Skin: Warm, Dry, No erythema, No rash.     LABS  Results for orders placed or performed during the hospital encounter " of 09/09/19   CHLAMYDIA & GC BY PCR   Result Value Ref Range    Source Vaginal    URINALYSIS (UA)   Result Value Ref Range    Color DK Yellow     Character Clear     Specific Gravity 1.030 <1.035    Ph 7.0 5.0 - 8.0    Glucose Negative Negative mg/dL    Ketones 15 (A) Negative mg/dL    Protein Negative Negative mg/dL    Bilirubin Negative Negative    Urobilinogen, Urine 1.0 Negative    Nitrite Negative Negative    Leukocyte Esterase Negative Negative    Occult Blood Moderate (A) Negative    Micro Urine Req Microscopic    HCG QUALITATIVE UR (Lab)   Result Value Ref Range    Beta-Hcg Urine Negative Negative   VAGINAL PATHOGENS DNA PANEL   Result Value Ref Range    Candida species DNA Probe Negative Negative    Trichamonas vaginalis DNA Probe Negative Negative    Gardnerella vaginalis DNA Probe Negative Negative   URINE MICROSCOPIC (W/UA)   Result Value Ref Range    WBC 0-2 /hpf    RBC 2-5 (A) /hpf    Bacteria Few (A) None /hpf    Epithelial Cells Few /hpf    Hyaline Cast 0-2 /lpf       All labs reviewed by me.    RADIOLOGY  No orders to display     The radiologist's interpretation of all radiological studies have been reviewed by me.    COURSE & MEDICAL DECISION MAKING  Pertinent Labs & Imaging studies reviewed. (See chart for details)    9:45 PM - Patient seen and examined at bedside. Ordered POC UA, POC Urine pregnancy, Wet Prep, and Chlamydia & GC by PCR to evaluate her symptoms. The differential diagnoses include but are not limited to: chlamydia, metromenorrhagia, urinary tract infection, gonorrhea, bacterial vaginitis    10:07 PM-  Patient expressed hesitation to have a pelvic examination completed at this time. Discussed with her the importance of having a pelvic examination completed so that we may treat her appropriately. The patient is now understanding and agreeable to the pelvic examination.    10:17 PM - Patient will be treated with Rocephin 250 mg, Zithromax 1000 mg, and Tonny 4 mg.    10:19 PM - Ordered  Urine Microscopic    10:26 PM - Ordered HCG Qual UR and UA    10:39 PM - Patient will be treated with lidocaine 1% injection.    10:42 PM - Ordered Vaginal Pathogens DNA Panel     Medical Decision Making: Patient presents with concern for possible STD exposure after having sex again with a partner who was not treated.  She was empirically treated with antibiotics.  At this point time I think her metromenorrhagia may be secondary to a hormonal imbalance.  She is not pregnant.  She has very light vaginal bleeding at this point time I do not think ultrasound is warranted at this point.  Patient will follow up with her doctor for STD tests that are still pending peer     The patient will return for new or worsening symptoms and is stable at the time of discharge.    The patient is referred to a primary physician for blood pressure management, diabetic screening, and for all other preventative health concerns.        DISPOSITION:  Patient will be discharged home in stable condition.    FOLLOW UP:  Your doctor    Schedule an appointment as soon as possible for a visit in 1 week      [place] [id number]  [phone]    For further STD testing      OUTPATIENT MEDICATIONS:  New Prescriptions    No medications on file         FINAL IMPRESSION  1. STD exposure    2. Menorrhagia with irregular cycle          Mervin CRANDALL (Peter), am scribing for, and in the presence of, Marques Zarate M.D.    Electronically signed by: Mervin Chase (Harrietibarmani), 9/9/2019    Marques CRANDALL M.D. personally performed the services described in this documentation, as scribed by Mervin Chase in my presence, and it is both accurate and complete.    C    The note accurately reflects work and decisions made by me.  Marques Zarate  9/10/2019  12:19 AM

## 2019-09-10 NOTE — ED TRIAGE NOTES
"Chief Complaint   Patient presents with   • Exposure to STD     known exposure to chlamydia, denies discharge   • Menstrual Problem     \"cramping more than usual\"     Explained wait time and triage process to pt. Pt placed back out in lobby, told to notify ED tech or triage RN of any changes, verbalized understanding.    "

## 2019-09-10 NOTE — DISCHARGE INSTRUCTIONS
Do not have sex until your partner is treated.  Use condoms.  Return the emergency department if you have abdominal pain, fever.

## 2019-09-13 NOTE — PROGRESS NOTES
"ED Positive Culture Follow-up/Notification Note:    Date: 9/13/19     Patient seen in the ED on 9/9/2019 for STI exposure and more abdominal cramping then normal for her menstruation.   1. STD exposure    2. Menorrhagia with irregular cycle      Given Rocephin 250 mg IM and Azithromycin 1 g po in the ER.   Discharge Medication List as of 9/10/2019 12:07 AM          Allergies: Patient has no known allergies.     Vitals:    09/09/19 1950 09/09/19 2020 09/10/19 0019   BP: 154/99  125/73   Pulse: (!) 127  85   Resp: 16  16   Temp: 35.9 °C (96.6 °F)  36.5 °C (97.7 °F)   TempSrc: Temporal  Temporal   SpO2: 98%  99%   Weight:  72.9 kg (160 lb 11.5 oz)    Height:  1.613 m (5' 3.5\")        Final cultures:   Results     Procedure Component Value Units Date/Time    CHLAMYDIA & GC BY PCR [[id number]]  (Abnormal) Collected:  09/09/19 2219    Order Status:  Completed Specimen:  Genital Updated:  09/10/19 2112     C. trachomatis by PCR Negative     N. gonorrhoeae by PCR POSITIVE     Source Vaginal    URINALYSIS (UA) [[id number]]  (Abnormal) Collected:  09/09/19 2219    Order Status:  Completed Specimen:  Urine Updated:  09/09/19 2321     Color DK Yellow     Character Clear     Specific Gravity 1.030     Ph 7.0     Glucose Negative mg/dL      Ketones 15 mg/dL      Protein Negative mg/dL      Bilirubin Negative     Urobilinogen, Urine 1.0     Nitrite Negative     Leukocyte Esterase Negative     Occult Blood Moderate     Micro Urine Req Microscopic    WET PREP [[id number]] Collected:  09/09/19 2219    Order Status:  Canceled Specimen:  Vaginal Fluid           Plan:   Appropriate antibiotic therapy prescribed to treat Gonorrhea while the patient was in the ER.  No further treatment indicated.   I have attempted to contact the patient to notify of positive result, but there was no answer. Left a message to return call for results.   Will also send the patient a letter to notify of result and encourage follow up with the [facility] for " further information.      Devora Younger    ADDENDUM: 9/13/19 9120  Patient returned call. No letter sent. She was informed of results and encouraged to seek additional testing for Hepatitis C, HIV and syphilis at the [facility]. I have recommended she remain abstinent for 7 days after treatment and encouraged her to inform any partners within the last 60 days of the result.    Devora Younger, PharmD

## 2025-03-06 ENCOUNTER — APPOINTMENT (OUTPATIENT)
Dept: BEHAVIORAL HEALTH | Facility: CLINIC | Age: 35
End: 2025-03-06
Payer: MEDICAID

## 2025-03-06 DIAGNOSIS — F41.1 GAD (GENERALIZED ANXIETY DISORDER): ICD-10-CM

## 2025-03-06 DIAGNOSIS — F33.2 SEVERE EPISODE OF RECURRENT MAJOR DEPRESSIVE DISORDER, WITHOUT PSYCHOTIC FEATURES (HCC): ICD-10-CM

## 2025-03-06 DIAGNOSIS — G47.09 OTHER INSOMNIA: ICD-10-CM

## 2025-03-06 PROCEDURE — 96127 BRIEF EMOTIONAL/BEHAV ASSMT: CPT | Mod: 95 | Performed by: PSYCHIATRY & NEUROLOGY

## 2025-03-06 PROCEDURE — 99204 OFFICE O/P NEW MOD 45 MIN: CPT | Mod: 95 | Performed by: PSYCHIATRY & NEUROLOGY

## 2025-03-06 RX ORDER — DOXEPIN 6 MG/1
TABLET, FILM COATED ORAL
Qty: 30 TABLET | Refills: 1 | Status: SHIPPED | OUTPATIENT
Start: 2025-03-06

## 2025-03-06 RX ORDER — ESCITALOPRAM OXALATE 10 MG/1
TABLET ORAL
Qty: 34 TABLET | Refills: 0 | Status: SHIPPED | OUTPATIENT
Start: 2025-03-06 | End: 2025-04-12

## 2025-03-06 ASSESSMENT — ANXIETY QUESTIONNAIRES
3. WORRYING TOO MUCH ABOUT DIFFERENT THINGS: NEARLY EVERY DAY
4. TROUBLE RELAXING: NEARLY EVERY DAY
2. NOT BEING ABLE TO STOP OR CONTROL WORRYING: NEARLY EVERY DAY
7. FEELING AFRAID AS IF SOMETHING AWFUL MIGHT HAPPEN: NEARLY EVERY DAY
6. BECOMING EASILY ANNOYED OR IRRITABLE: SEVERAL DAYS
5. BEING SO RESTLESS THAT IT IS HARD TO SIT STILL: SEVERAL DAYS
1. FEELING NERVOUS, ANXIOUS, OR ON EDGE: NEARLY EVERY DAY
GAD7 TOTAL SCORE: 17

## 2025-03-06 ASSESSMENT — PATIENT HEALTH QUESTIONNAIRE - PHQ9
SUM OF ALL RESPONSES TO PHQ QUESTIONS 1-9: 22
5. POOR APPETITE OR OVEREATING: 3 - NEARLY EVERY DAY
CLINICAL INTERPRETATION OF PHQ2 SCORE: 6
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
## 2025-03-11 ENCOUNTER — TELEPHONE (OUTPATIENT)
Dept: BEHAVIORAL HEALTH | Facility: CLINIC | Age: 35
End: 2025-03-11

## 2025-03-11 DIAGNOSIS — G47.09 OTHER INSOMNIA: ICD-10-CM

## 2025-03-11 NOTE — TELEPHONE ENCOUNTER
MEDICATION PRIOR AUTHORIZATION DOCUMENTATION     1. Name of Medication: Doxepin 6mg     2. Requested By: Bev      3. Date Prior Auth Submitted: 03/11/2025     5. Prior Auth Status? Pending

## 2025-04-08 ENCOUNTER — APPOINTMENT (OUTPATIENT)
Dept: BEHAVIORAL HEALTH | Facility: CLINIC | Age: 35
End: 2025-04-08
Payer: MEDICAID

## 2025-04-08 DIAGNOSIS — F33.2 SEVERE EPISODE OF RECURRENT MAJOR DEPRESSIVE DISORDER, WITHOUT PSYCHOTIC FEATURES (HCC): ICD-10-CM

## 2025-04-08 DIAGNOSIS — F41.1 GAD (GENERALIZED ANXIETY DISORDER): ICD-10-CM

## 2025-04-08 NOTE — TELEPHONE ENCOUNTER
pt did not make it under the [facility] this evening and we had to reschedule her appt but she is all out Lexapro 10mg can you send over a refill until her next F/U appt .      Received request via: Patient    Was the patient seen in the last year in this department? Yes    Does the patient have an active prescription (recently filled or refills available) for medication(s) requested? No    Pharmacy Name: [facility] / [address]     Does the patient have [facility] and need 100-day supply? (This applies to ALL medications) Patient does not have SCP

## 2025-04-08 NOTE — PROGRESS NOTES
This evaluation was conducted via {platform:04981} using secure and encrypted videoconferencing technology. {Virtual or Telemedicine:42279}      PSYCHIATRY FOLLOW-UP NOTE      Name: Mae Wells  MRN: [id number]  : 1990  Age: 34 y.o.  Date of assessment: 2025  PCP: No primary care provider on file.  Persons in attendance: Patient      REASON FOR VISIT/CHIEF COMPLAINT (as stated by Patient):  Mae Wells is a 34 y.o., White female, attending follow-up appointment for mood and anxiety management.      HISTORY OF PRESENT ILLNESS:  Mae Wells is a 34 y.o. old female with SOPHIA and MDD comes in today for follow up. Patient was last seen 1 month ago, and following treatment planning recommendations were done:  Add Lexapro 5 mg daily X 1 week --> 10 mg daily.  Add Doxepin 10-20 mg HS PRN for sleep.  Initiate psychotherapy    History of Present Illness        PSYCHOTHERAPY ASPECT OF SESSION (*** MIN):  ***      CURRENT MEDICATIONS:  Current Outpatient Medications   Medication Sig Dispense Refill    escitalopram (LEXAPRO) 10 MG Tab Take 0.5 Tablets by mouth 1/2 hour after dinner for 7 days, THEN 1 Tablet 1/2 hour after dinner for 30 days. 34 Tablet 0    Doxepin HCl 6 MG Tab Take half to one tab at bedtime as needed for sleep 30 Tablet 1    Ibuprofen (ADVIL) 200 MG CAPS Take  by mouth.      oxycodone-acetaminophen (PERCOCET) 5-325 MG TABS Take 1-2 Tabs by mouth every four hours as needed for Mild Pain. 20 Each 0     No current facility-administered medications for this visit.       MEDICAL HISTORY  No past medical history on file.  No past surgical history on file.      PAST PSYCHIATRIC MEDICATIONS  Paxil, Celexa (made her more anxious), Lexapro  Effexor (made her anxiety worse), Cymbalta (as a kid)  Trazodone: tiredness  Propranolol, Hydroxyzine, Buspar, Klonopin     REVIEW OF SYSTEMS:        Constitutional negative   Eyes negative   Ears/Nose/Mouth/Throat negative   Cardiovascular  negative   Respiratory negative   Gastrointestinal negative   Genitourinary negative   Muscular negative   Integumentary negative   Neurological negative   Endocrine negative   Hematologic/Lymphatic negative     PHYSICAL EXAMINAION:  Vital signs: There were no vitals taken for this visit.  Musculoskeletal: Normal gait.   Abnormal movements: ***      MENTAL STATUS EXAMINATION      General:   - Grooming and hygiene: {[facility] GROOM[facility]},   - Apparent distress: ***,   - Behavior: {[facility] BEHAVIOR:87361432}  - Eye Contact:  {[facility] EYE CONTACT:18175050},   - no psychomotor agitation or retardation ***   - Participation: {[facility] PARTICIPATION MEASURES:59778041}  Orientation: {[facility] :65688193} to person, place and time  Mood: {[facility] MOOD:48419968}  Affect: {[facility] AFFECT:10189595},  Thought Process: {[facility] THOUGHT PROCESS:52984685}  Thought Content: Denies suicidal or homicidal ideations, intent or plan {[facility] THOUGHT CONTENT:36814647}  Perception: Denies auditory or visual hallucinations. No delusions noted {[facility] PERCEPTION:27399832}  Attention span and concentration: Intact ***  Speech:{[facility] SPEECH:49913115}  Language: Appropriate ***  Insight: {GOOD/ADEQUATE/LIMITED/POOR:92884730}  Judgment: {GOOD/ADEQUATE/LIMITED/POOR:20072079}  Recent and remote memory: {[facility] MEMORY:95869200}        DEPRESSION SCREENING:      3/6/2025     2:00 PM   Depression Screen (PHQ-2/PHQ-9)   PHQ-2 Total Score 6   PHQ-9 Total Score 22       Interpretation of PHQ-9 Total Score   Score Severity   1-4 No Depression   5-9 Mild Depression   10-14 Moderate Depression   15-19 Moderately Severe Depression   20-27 Severe Depression    CURRENT RISK:       Suicidal: {[facility] RATINGS:11345912}       Homicidal: {[facility] RATINGS:65406280}       Self-Harm: {[facility] RATINGS:52424445}       Relapse: {[facility] RATINGS:68186288}       Crisis Safety Plan Reviewed {YES/NO/NOT INDICATED:94798}       If evidence of imminent risk is present, intervention/plan:      MEDICAL  RECORDS/LABS/DIAGNOSTIC TESTS REVIEWED:  No new lab since last visit     [place] [facility] records -   Reviewed     (1) SOPHIA; (2) MDD  PHQ9: 22; GAD7: 17  Add Lexapro 5 mg daily X 1 week --> 10 mg daily.  Add Doxepin 10-20 mg HS PRN for sleep.  Initiate psychotherapy  Medication options, alternatives (including no medications) and medication risks/benefits/side effects were discussed in detail.  Explained importance of contraceptive measures while on psychotropic medications, educated to let provider know if ever pregnant or wanting to become pregnant. Verbalized understanding.  The patient was advised to call, message provider on [employer], or come in to the clinic if symptoms worsen or if any future questions/issues regarding their medications arise; the patient verbalized understanding and agreement.    The patient was educated to call 911, call the suicide hotline, or go to local ER if having thoughts of suicide or homicide; verbalized understanding.      Billing Coding based on:  15672 based on MDM  89790: based on psychotherapy timing  96190: based on total time spent of 40 min in evaluation, charting and file review.   : based on the medical complexity and need for changes in treatment discussed above    Return to clinic in *** or sooner if symptoms worsen.  Next Appointment: instruction provided on how to make the next appointment.     The proposed treatment plan was discussed with the patient who was provided the opportunity to ask questions and make suggestions regarding alternative treatment. Patient verbalized understanding and expressed agreement with the plan.       Julius Abrams M.D.  04/08/25    This note was created using voice recognition software (Dragon). The accuracy of the dictation is limited by the abilities of the software. I have reviewed the note prior to signing, however some errors in grammar and context are still possible. If you have any questions related to this note please do not hesitate  to contact our office.

## 2025-04-09 RX ORDER — ESCITALOPRAM OXALATE 10 MG/1
10 TABLET ORAL
Qty: 30 TABLET | Refills: 0 | Status: SHIPPED | OUTPATIENT
Start: 2025-04-09 | End: 2025-04-14 | Stop reason: SDUPTHER

## 2025-04-14 DIAGNOSIS — F41.1 GAD (GENERALIZED ANXIETY DISORDER): ICD-10-CM

## 2025-04-14 DIAGNOSIS — F33.2 SEVERE EPISODE OF RECURRENT MAJOR DEPRESSIVE DISORDER, WITHOUT PSYCHOTIC FEATURES (HCC): ICD-10-CM

## 2025-04-14 RX ORDER — ESCITALOPRAM OXALATE 10 MG/1
TABLET ORAL
Qty: 34 TABLET | Refills: 0 | OUTPATIENT
Start: 2025-04-14

## 2025-04-18 ENCOUNTER — OFFICE VISIT (OUTPATIENT)
Dept: BEHAVIORAL HEALTH | Facility: CLINIC | Age: 35
End: 2025-04-18
Payer: MEDICAID

## 2025-04-18 DIAGNOSIS — G47.09 OTHER INSOMNIA: ICD-10-CM

## 2025-04-18 DIAGNOSIS — F33.2 SEVERE EPISODE OF RECURRENT MAJOR DEPRESSIVE DISORDER, WITHOUT PSYCHOTIC FEATURES (HCC): ICD-10-CM

## 2025-04-18 DIAGNOSIS — F41.1 GAD (GENERALIZED ANXIETY DISORDER): ICD-10-CM

## 2025-04-18 DIAGNOSIS — F17.200 NICOTINE USE DISORDER: ICD-10-CM

## 2025-04-18 PROCEDURE — 99214 OFFICE O/P EST MOD 30 MIN: CPT | Performed by: PSYCHIATRY & NEUROLOGY

## 2025-04-18 RX ORDER — DOXEPIN HYDROCHLORIDE 25 MG/1
CAPSULE ORAL
Qty: 60 CAPSULE | Refills: 1 | Status: SHIPPED | OUTPATIENT
Start: 2025-04-18

## 2025-04-18 RX ORDER — ESCITALOPRAM OXALATE 20 MG/1
20 TABLET ORAL DAILY
Qty: 90 TABLET | Refills: 1 | Status: SHIPPED | OUTPATIENT
Start: 2025-04-18

## 2025-04-18 RX ORDER — ESCITALOPRAM OXALATE 20 MG/1
20 TABLET ORAL DAILY
Qty: 30 TABLET | Refills: 1 | Status: SHIPPED | OUTPATIENT
Start: 2025-04-18 | End: 2025-04-18 | Stop reason: SDUPTHER

## 2025-04-18 RX ORDER — NICOTINE 21 MG/24HR
1 PATCH, TRANSDERMAL 24 HOURS TRANSDERMAL EVERY 24 HOURS
Qty: 90 PATCH | Refills: 1 | Status: SHIPPED | OUTPATIENT
Start: 2025-04-18

## 2025-04-18 NOTE — PROGRESS NOTES
PSYCHIATRY FOLLOW-UP NOTE      Name: Mae Wells  MRN: [id number]  : 1990  Age: 34 y.o.  Date of assessment: 2025  PCP: Pcp Pt States None  Persons in attendance: Patient      REASON FOR VISIT/CHIEF COMPLAINT (as stated by Patient):  Mae Wells is a 34 y.o., White female, attending follow-up appointment for mood and anxiety management.      HISTORY OF PRESENT ILLNESS:  Mae Wells is a 34 y.o. old female with SOPHIA and MDD comes in today for follow up. Patient was last seen 1 month ago, and following treatment planning recommendations were done:  Add Lexapro 5 mg daily X 1 week --> 10 mg daily.  Add Doxepin 10-20 mg HS PRN for sleep.  Initiate psychotherapy    History of Present Illness      Patient is compliant with Lexapro 10 mg and taking doxepin 6 mg but has not noticed major changes but denies any acute side effects either.  Agreed with increasing Lexapro to 20 mg and getting higher dose of doxepin at 25 mg as sleep has remained disrupted.  Agreed with using doxepin between 25 to 50 mg with close monitoring for grogginess.  Agreed with contacting me on my chart if drowsiness is noted and will consider getting lower dose of 10 to 20 mg.  Patient also reports smoking 1 pack a day and this is impacting her breathing.  She walked to the clinic for this appointment and forgot to take her inhaler.  Evident difficulty breathing was noted.  We discussed the option of Chantix, Wellbutrin versus nicotine replacement therapy.  I agreed with avoiding Chantix and Wellbutrin to her mood and anxiety stable and agreed with getting nicotine patches.      CURRENT MEDICATIONS:  Current Outpatient Medications   Medication Sig Dispense Refill    escitalopram (LEXAPRO) 10 MG Tab Take 1 Tablet by mouth 1/2 hour after dinner for 30 days. 30 Tablet 0    Doxepin HCl 6 MG Tab Take half to one tab at bedtime as needed for sleep 30 Tablet 1    Ibuprofen (ADVIL) 200 MG CAPS Take  by mouth.       oxycodone-acetaminophen (PERCOCET) 5-325 MG TABS Take 1-2 Tabs by mouth every four hours as needed for Mild Pain. 20 Each 0     No current facility-administered medications for this visit.       MEDICAL HISTORY  No past medical history on file.  No past surgical history on file.      PAST PSYCHIATRIC MEDICATIONS  Paxil, Celexa (made her more anxious), Lexapro  Effexor (made her anxiety worse), Cymbalta (as a kid)  Trazodone (tiredness), Doxepin  Propranolol, Hydroxyzine, Buspar, Klonopin     REVIEW OF SYSTEMS:        Constitutional negative   Eyes negative   Ears/Nose/Mouth/Throat negative   Cardiovascular negative   Respiratory negative   Gastrointestinal negative   Genitourinary negative   Muscular negative   Integumentary negative   Neurological negative   Endocrine negative   Hematologic/Lymphatic negative     PHYSICAL EXAMINAION:  Vital signs: There were no vitals taken for this visit.  Musculoskeletal: Normal gait.   Abnormal movements: none      MENTAL STATUS EXAMINATION      General:   - Grooming and hygiene: Casual,   - Apparent distress: none,   - Behavior: Calm  - Eye Contact:  Good,   - no psychomotor agitation or retardation    - Participation: Active verbal participation  Orientation: Alert and Fully Oriented to person, place and time  Mood: Depressed and Anxious  Affect: Constricted,  Thought Process: Logical and Goal-directed  Thought Content: Denies suicidal or homicidal ideations, intent or plan   Perception: Denies auditory or visual hallucinations. No delusions noted   Attention span and concentration: Intact   Speech:Rate within normal limits and Volume within normal limits  Language: Appropriate   Insight: Good  Judgment: Good  Recent and remote memory: No gross evidence of memory deficits        DEPRESSION SCREENING:      3/6/2025     2:00 PM   Depression Screen (PHQ-2/PHQ-9)   PHQ-2 Total Score 6   PHQ-9 Total Score 22       Interpretation of PHQ-9 Total Score   Score Severity   1-4 No  Depression   5-9 Mild Depression   10-14 Moderate Depression   15-19 Moderately Severe Depression   20-27 Severe Depression    CURRENT RISK:       Suicidal: Low       Homicidal: Low       Self-Harm: Low       Relapse: Low       Crisis Safety Plan Reviewed Not Indicated       If evidence of imminent risk is present, intervention/plan:      MEDICAL RECORDS/LABS/DIAGNOSTIC TESTS REVIEWED:  No new lab since last visit     [place] [facility] records -   Reviewed     (1) SOPHIA; (2) MDD; (3) Nicotine use  No improvement  Increase Lexapro 20 mg daily.  Increase Doxepin 25-50 mg HS PRN for sleep.  Add Nicotine patch 14 mg/24 hr  Initiate psychotherapy  Medication options, alternatives (including no medications) and medication risks/benefits/side effects were discussed in detail.  Explained importance of contraceptive measures while on psychotropic medications, educated to let provider know if ever pregnant or wanting to become pregnant. Verbalized understanding.  The patient was advised to call, message provider on [employer], or come in to the clinic if symptoms worsen or if any future questions/issues regarding their medications arise; the patient verbalized understanding and agreement.    The patient was educated to call 911, call the suicide hotline, or go to local ER if having thoughts of suicide or homicide; verbalized understanding.      Billing Coding based on:  13787 based on MDM    Return to clinic in 1 month or sooner if symptoms worsen.  Next Appointment: instruction provided on how to make the next appointment.     The proposed treatment plan was discussed with the patient who was provided the opportunity to ask questions and make suggestions regarding alternative treatment. Patient verbalized understanding and expressed agreement with the plan.       Julius Abrams M.D.  04/18/25    This note was created using voice recognition software (Dragon). The accuracy of the dictation is limited by the abilities of the software. I  have reviewed the note prior to signing, however some errors in grammar and context are still possible. If you have any questions related to this note please do not hesitate to contact our office.

## 2025-05-13 ENCOUNTER — OFFICE VISIT (OUTPATIENT)
Dept: BEHAVIORAL HEALTH | Facility: CLINIC | Age: 35
End: 2025-05-13
Payer: MEDICAID

## 2025-05-13 DIAGNOSIS — F41.1 GAD (GENERALIZED ANXIETY DISORDER): ICD-10-CM

## 2025-05-13 DIAGNOSIS — F33.2 SEVERE EPISODE OF RECURRENT MAJOR DEPRESSIVE DISORDER, WITHOUT PSYCHOTIC FEATURES (HCC): ICD-10-CM

## 2025-05-13 PROCEDURE — 90791 PSYCH DIAGNOSTIC EVALUATION: CPT | Performed by: MARRIAGE & FAMILY THERAPIST
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
## 2025-05-20 ENCOUNTER — APPOINTMENT (OUTPATIENT)
Dept: BEHAVIORAL HEALTH | Facility: CLINIC | Age: 35
End: 2025-05-20
Payer: MEDICAID

## 2025-05-22 ENCOUNTER — OFFICE VISIT (OUTPATIENT)
Dept: BEHAVIORAL HEALTH | Facility: CLINIC | Age: 35
End: 2025-05-22
Payer: MEDICAID

## 2025-05-22 DIAGNOSIS — F17.200 NICOTINE USE DISORDER: ICD-10-CM

## 2025-05-22 DIAGNOSIS — F41.1 GAD (GENERALIZED ANXIETY DISORDER): ICD-10-CM

## 2025-05-22 DIAGNOSIS — G47.09 OTHER INSOMNIA: Primary | ICD-10-CM

## 2025-05-22 DIAGNOSIS — F33.2 SEVERE EPISODE OF RECURRENT MAJOR DEPRESSIVE DISORDER, WITHOUT PSYCHOTIC FEATURES (HCC): ICD-10-CM

## 2025-05-22 PROCEDURE — 99214 OFFICE O/P EST MOD 30 MIN: CPT | Performed by: PSYCHIATRY & NEUROLOGY

## 2025-05-22 RX ORDER — ESCITALOPRAM OXALATE 20 MG/1
20 TABLET ORAL DAILY
Qty: 90 TABLET | Refills: 1 | Status: SHIPPED | OUTPATIENT
Start: 2025-05-22

## 2025-05-22 RX ORDER — CLONIDINE HYDROCHLORIDE 0.1 MG/1
0.1 TABLET ORAL
Qty: 30 TABLET | Refills: 2 | Status: SHIPPED | OUTPATIENT
Start: 2025-05-22

## 2025-05-22 NOTE — PROGRESS NOTES
PSYCHIATRY FOLLOW-UP NOTE      Name: Mae Wells  MRN: [id number]  : 1990  Age: 34 y.o.  Date of assessment: 2025  PCP: Pcp Pt States None  Persons in attendance: Patient      REASON FOR VISIT/CHIEF COMPLAINT (as stated by Patient):  Mae Wells is a 34 y.o., White female, attending follow-up appointment for mood and anxiety management.      HISTORY OF PRESENT ILLNESS:  Mae Wells is a 34 y.o. old female with SOPHIA and MDD comes in today for follow up. Patient was last seen 1 month ago, and following treatment planning recommendations were done:  Increase Lexapro 20 mg daily.  Increase Doxepin 25-50 mg HS PRN for sleep.  Add Nicotine patch 14 mg/24 hr  Initiate psychotherapy    History of Present Illness    Her daytime anxiety is described as mild, with a recent situational trigger causing significant distress. This situation has since resolved.   She has initiated therapy   She is currently on Lexapro 20 mg, which she reports as beneficial, leading to a more positive outlook and reduced depressive symptoms.     She has been experiencing lifelong insomnia, which she attributes to her anxiety.   She reports difficulty in initiating sleep, often feeling a surge of energy at night even after an active day.   She does not experience nightmares, although she did in the past.   She is currently on doxepin 25 mg, which induces drowsiness the following day but does not aid in sleep initiation. Once asleep, she experiences difficulty waking up due to grogginess.   She has previously tried melatonin, which also failed to aid in sleep initiation. She has expressed a preference for Ambien, having used it in the past.  Agreed with considering clonidine at bedtime to help with anxiety and reactivity driven sleep onset issues but also agreed with getting sleep medicine evaluation to assess the patient have comorbid sleep disorder causing chronic insomnia.  Agreed with avoiding  medicines like Ambien or benzodiazepines with risk of dependency.          CURRENT MEDICATIONS:  Current Medications[1]    MEDICAL HISTORY  Past Medical History[2]  Past Surgical History[3]      PAST PSYCHIATRIC MEDICATIONS  Paxil, Celexa (made her more anxious), Lexapro  Effexor (made her anxiety worse), Cymbalta (as a kid)  Trazodone (tiredness), Doxepin  Propranolol, Hydroxyzine, Buspar, Klonopin     REVIEW OF SYSTEMS:        Constitutional negative   Eyes negative   Ears/Nose/Mouth/Throat negative   Cardiovascular negative   Respiratory negative   Gastrointestinal negative   Genitourinary negative   Muscular negative   Integumentary negative   Neurological negative   Endocrine negative   Hematologic/Lymphatic negative     PHYSICAL EXAMINAION:  Vital signs: There were no vitals taken for this visit.  Musculoskeletal: Normal gait.   Abnormal movements: none      MENTAL STATUS EXAMINATION      General:   - Grooming and hygiene: Casual,   - Apparent distress: none,   - Behavior: Calm  - Eye Contact:  Good,   - no psychomotor agitation or retardation    - Participation: Active verbal participation  Orientation: Alert and Fully Oriented to person, place and time  Mood: Anxious  Affect: Flexible,  Thought Process: Logical and Goal-directed  Thought Content: Denies suicidal or homicidal ideations, intent or plan   Perception: Denies auditory or visual hallucinations. No delusions noted   Attention span and concentration: Intact   Speech:Rate within normal limits and Volume within normal limits  Language: Appropriate   Insight: Good  Judgment: Good  Recent and remote memory: No gross evidence of memory deficits        DEPRESSION SCREENING:      3/6/2025     2:00 PM   Depression Screen (PHQ-2/PHQ-9)   PHQ-2 Total Score 6   PHQ-9 Total Score 22       Interpretation of PHQ-9 Total Score   Score Severity   1-4 No Depression   5-9 Mild Depression   10-14 Moderate Depression   15-19 Moderately Severe Depression   20-27 Severe  Depression    CURRENT RISK:       Suicidal: Low       Homicidal: Low       Self-Harm: Low       Relapse: Low       Crisis Safety Plan Reviewed Not Indicated       If evidence of imminent risk is present, intervention/plan:      MEDICAL RECORDS/LABS/DIAGNOSTIC TESTS REVIEWED:  No new lab since last visit     [place] [facility] records -   Reviewed     (1) SOPHIA; (2) MDD; (3) Chronic insomnia; (4) Nicotine use  Mood & anxiety improving; insomnia persisting  Continue Lexapro 20 mg daily.  Stop Doxepin --> Add Clonidine 0.1 mg HS PRN for sleep.  Referral placed to sleep medicine for evaluation of chronic insomnia.  Continue Nicotine patch 14 mg/24 hr  Continue psychotherapy  Medication options, alternatives (including no medications) and medication risks/benefits/side effects were discussed in detail.  Explained importance of contraceptive measures while on psychotropic medications, educated to let provider know if ever pregnant or wanting to become pregnant. Verbalized understanding.  The patient was advised to call, message provider on [employer], or come in to the clinic if symptoms worsen or if any future questions/issues regarding their medications arise; the patient verbalized understanding and agreement.    The patient was educated to call 911, call the suicide hotline, or go to local ER if having thoughts of suicide or homicide; verbalized understanding.      Billing Coding based on:  01690 based on MDM    Return to clinic in 1 week after sleep medicine evaluation or sooner if symptoms worsen.  Next Appointment: instruction provided on how to make the next appointment.     The proposed treatment plan was discussed with the patient who was provided the opportunity to ask questions and make suggestions regarding alternative treatment. Patient verbalized understanding and expressed agreement with the plan.       Julius Abrams M.D.  05/22/25    This note was created using voice recognition software (Dragon). The accuracy of the  dictation is limited by the abilities of the software. I have reviewed the note prior to signing, however some errors in grammar and context are still possible. If you have any questions related to this note please do not hesitate to contact our office.            [1]   Current Outpatient Medications   Medication Sig Dispense Refill    doxepin (SINEQUAN) 25 MG Cap Take 1 to 2 capsules at bedtime as needed for sleep 60 Capsule 1    escitalopram (LEXAPRO) 20 MG tablet Take 1 Tablet by mouth every day. 90 Tablet 1    nicotine (NICODERM) 14 MG/24HR PATCH 24 HR Place 1 Patch on the skin every 24 hours. 90 Patch 1    Ibuprofen (ADVIL) 200 MG CAPS Take  by mouth.      oxycodone-acetaminophen (PERCOCET) 5-325 MG TABS Take 1-2 Tabs by mouth every four hours as needed for Mild Pain. 20 Each 0     No current facility-administered medications for this visit.   [2] No past medical history on file.  [3] No past surgical history on file.

## 2025-05-30 NOTE — Clinical Note
REFERRAL APPROVAL NOTICE         Sent on May 30, 2025                   Mae Wells  [address]  [place] [address]                   Dear Ms. Wells,    After a careful review of the medical information and benefit coverage, [facility] has processed your referral. See below for additional details.    If applicable, you must be actively enrolled with your insurance for coverage of the authorized service. If you have any questions regarding your coverage, please contact your insurance directly.    REFERRAL INFORMATION   Referral #:  [id number]  Referred-To Department    Referred-By Provider:  Pulmonary and Sleep Medicine    Julius Abrams M.D.   Pulmonary/sleep [facility]      [address]  [address]  [place] [address]  [phone] [address], [address]  [place] [id number] [phone]    Referral Start Date:  05/22/2025  Referral End Date:   05/22/2026           SCHEDULING  If you do not already have an appointment, please call [phone] to make an appointment.   MORE INFORMATION  As a reminder, [facility] ownership has changed, meaning this location is now owned and operated by [facility]. As such, we want to clarify that our patients should expect to receive two separate bills for the services received at [facility] - one representing the [facility] facility fees as the owner of the establishment, and the other to represent the physician's services and subsequent fees. You can speak with your insurance carrier for a pricing estimate by calling the customer service number on the back of your card and ask about charges for a hospital outpatient visit.  If you do not already have a [employer] account, sign up at: [email or web address]  You can access your medical information, make appointments, see lab results,  billing information, and more.  If you have questions regarding this referral, please contact  the [facility] department at:             [phone]. Monday - Friday 7:30AM - 5:00PM.      Sincerely,  [facility]

## 2025-06-02 ENCOUNTER — OFFICE VISIT (OUTPATIENT)
Dept: BEHAVIORAL HEALTH | Facility: CLINIC | Age: 35
End: 2025-06-02
Payer: MEDICAID

## 2025-06-02 DIAGNOSIS — F43.21 ADJUSTMENT DISORDER WITH DEPRESSED MOOD: Primary | ICD-10-CM

## 2025-06-02 PROCEDURE — 90834 PSYTX W PT 45 MINUTES: CPT | Performed by: MARRIAGE & FAMILY THERAPIST

## 2025-06-04 NOTE — PROGRESS NOTES
[facility]   Psychotherapy Progress Note        Name: Mae Wells  MRN: [id number]  : 1990  Age: 34 y.o.  Date of assessment: 2025  PCP: Pcp Pt States None  Persons in attendance: Patient  Total session time: 50 minutes      Topics addressed in psychotherapy include: Mae reports that she feels like she needs to make a decision soon about her relationship with her boyfriend who lives out of the country due to resentment from him. Mae reports having difficulty moving because she would need to bring her cat on a very long plane ride in the cargo area and she doesn't want to do that to her cat, but she also feels bad about not committing to moving to live with her boyfriend after having a long distance relationship for about 3 years. For homework she is encouraged to make a list of the pros and cons of staying in the [place] , and to make a list about the pros and cons of moving abroad to live with him. Solution focused interviewing conducted.    Objective Observations:   Participation:Active verbal participation, Attentive, Engaged, and Open to feedback   Grooming:Casual and Neat   Cognition:Alert and Fully Oriented   Eye Contact:Good   Mood:Euthymic   Affect:Flexible, Expansive, and Congruent with content   Thought Process:Logical and Goal-directed   Speech:Rate within normal limits and Volume within normal limits    Current Risk:   Suicide: low   Homicide: low   Self-Harm: low     Care Plan Updated: Yes    Does patient express agreement with the above plan? Yes     Diagnosis:  1. Adjustment disorder with depressed mood        Referral appointment(s) scheduled? Yes      BEATRICE Owens

## 2025-06-09 ENCOUNTER — APPOINTMENT (OUTPATIENT)
Dept: BEHAVIORAL HEALTH | Facility: CLINIC | Age: 35
End: 2025-06-09
Payer: MEDICAID

## 2025-06-10 ENCOUNTER — OFFICE VISIT (OUTPATIENT)
Dept: BEHAVIORAL HEALTH | Facility: CLINIC | Age: 35
End: 2025-06-10
Payer: MEDICAID

## 2025-06-10 DIAGNOSIS — F43.21 ADJUSTMENT DISORDER WITH DEPRESSED MOOD: Primary | ICD-10-CM

## 2025-06-10 DIAGNOSIS — F33.2 SEVERE EPISODE OF RECURRENT MAJOR DEPRESSIVE DISORDER, WITHOUT PSYCHOTIC FEATURES (HCC): ICD-10-CM

## 2025-06-10 PROCEDURE — 90834 PSYTX W PT 45 MINUTES: CPT | Performed by: MARRIAGE & FAMILY THERAPIST

## 2025-06-11 NOTE — PROGRESS NOTES
[facility]   Psychotherapy Progress Note        Name: Darshan Wells  MRN: [id number]  : 1990  Age: 34 y.o.  Date of assessment: 6/10/2025  PCP: Pcp Pt States None  Persons in attendance: Patient  Total session time: 50 minutes      Topics addressed in psychotherapy include: Darshan reports that she would like to discuss her codependency in relationships, and how she tends to fall into patterns of getting into relationships with people of varying degrees of dysfunction or toxicity, and enabling or at least tolerating their behaviors. She identifies that her childhood likely contribute to it. Intervention: We explored her family of origin, and experiences with her mom and dad who were dependent on substances throughout darshan's life, and the dynamics that it created for Darshan. We discussed her feelings about substance use and per personal history as well to improve awareness of how she interacts with substance dependent loved ones, what recognizing red flags early on might look like, and the benefits of boundary setting.     Objective Observations:   Participation:Active verbal participation, Attentive, Engaged, and Open to feedback   Grooming:Casual and Neat   Cognition:Alert and Fully Oriented   Eye Contact:Good   Mood:Euthymic   Affect:Flexible, Expansive, and Congruent with content   Thought Process:Logical and Goal-directed   Speech:Rate within normal limits and Volume within normal limits    Current Risk:   Suicide: low   Homicide: low   Self-Harm: low     Care Plan Updated: Yes    Does patient express agreement with the above plan? Yes     Diagnosis:  1. Adjustment disorder with depressed mood    2. Severe episode of recurrent major depressive disorder, without psychotic features (HCC)        Referral appointment(s) scheduled? Yes      BEATRICE Owens

## 2025-06-17 ENCOUNTER — OFFICE VISIT (OUTPATIENT)
Dept: BEHAVIORAL HEALTH | Facility: CLINIC | Age: 35
End: 2025-06-17
Payer: MEDICAID

## 2025-06-17 DIAGNOSIS — F41.1 GAD (GENERALIZED ANXIETY DISORDER): ICD-10-CM

## 2025-06-17 DIAGNOSIS — F33.2 SEVERE EPISODE OF RECURRENT MAJOR DEPRESSIVE DISORDER, WITHOUT PSYCHOTIC FEATURES (HCC): Primary | ICD-10-CM

## 2025-06-17 PROCEDURE — 90834 PSYTX W PT 45 MINUTES: CPT | Performed by: MARRIAGE & FAMILY THERAPIST

## 2025-06-18 ENCOUNTER — OFFICE VISIT (OUTPATIENT)
Dept: BEHAVIORAL HEALTH | Facility: CLINIC | Age: 35
End: 2025-06-18
Payer: MEDICAID

## 2025-06-18 DIAGNOSIS — F33.2 SEVERE EPISODE OF RECURRENT MAJOR DEPRESSIVE DISORDER, WITHOUT PSYCHOTIC FEATURES (HCC): Primary | ICD-10-CM

## 2025-06-18 DIAGNOSIS — F41.1 GAD (GENERALIZED ANXIETY DISORDER): ICD-10-CM

## 2025-06-18 DIAGNOSIS — G47.09 OTHER INSOMNIA: ICD-10-CM

## 2025-06-18 PROCEDURE — 99214 OFFICE O/P EST MOD 30 MIN: CPT | Performed by: PSYCHIATRY & NEUROLOGY

## 2025-06-18 PROCEDURE — 90833 PSYTX W PT W E/M 30 MIN: CPT | Performed by: PSYCHIATRY & NEUROLOGY

## 2025-06-18 RX ORDER — RAMELTEON 8 MG/1
8 TABLET ORAL NIGHTLY
Qty: 30 TABLET | Refills: 6 | Status: SHIPPED | OUTPATIENT
Start: 2025-06-18

## 2025-06-18 RX ORDER — DULOXETIN HYDROCHLORIDE 30 MG/1
30 CAPSULE, DELAYED RELEASE ORAL DAILY
Qty: 30 CAPSULE | Refills: 1 | Status: SHIPPED | OUTPATIENT
Start: 2025-06-18

## 2025-06-18 NOTE — PROGRESS NOTES
PSYCHIATRY FOLLOW-UP NOTE      Name: Mae Wells  MRN: [id number]  : 1990  Age: 34 y.o.  Date of assessment: 2025  PCP: Pcp Pt States None  Persons in attendance: Patient      REASON FOR VISIT/CHIEF COMPLAINT (as stated by Patient):  Mae Wells is a 34 y.o., White female, attending follow-up appointment for mood and anxiety management.      HISTORY OF PRESENT ILLNESS:  Mae Wells is a 34 y.o. old female with SOPHIA, MDD and insomnia comes in today for follow up. Patient was last seen 1 month ago, and following treatment planning recommendations were done:  Continue Lexapro 20 mg daily.  Stop Doxepin --> Add Clonidine 0.1 mg HS PRN for sleep.  Referral placed to sleep medicine for evaluation of chronic insomnia.  Continue Nicotine patch 14 mg/24 hr  Continue psychotherapy    History of Present Illness    She expresses a desire to discontinue her current antidepressant regimen, as she perceives it to be ineffective in managing her anxiety.   She reports that Lexapro has been beneficial in the past session but attributes this improvement to the resolution of a specific situational stressor.   Currently, she is experiencing low energy levels and increased depressive symptoms, which she believes are exacerbated by Lexapro.  She also reports that Celexa induced panic attacks.   She recalls a positive response to Paxil, particularly for social anxiety, but discontinued its use due to persistent headaches. She is considering a trial of Cymbalta, which she previously used at the age of 16 with good effect.   We discussed Cymbalta, Paxil and Prozac in detail.  Discussed the risk of withdrawal from Paxil discontinuation in the future and similar risk is seen with Cymbalta.  Patient is interested in taking the risk if benefit is seen.  After reviewing risk and benefit patient decided to consider Cymbalta and adding ramelteon for sleep as clonidine is not helpful.  She has a sleep  medicine evaluation in July.      PSYCHOTHERAPY ASPECT OF SESSION (16 MIN):  Session was dedicated to letting patient express her feelings related to trials of multiple medications and responses of her past psychiatrist experiences.  Validation was provided for appropriate emotional responses.  Most part of the session was dedicated to medication, active listening and implementing supportive therapy skills.      CURRENT MEDICATIONS:  Current Medications[1]    MEDICAL HISTORY  Past Medical History[2]  Past Surgical History[3]      PAST PSYCHIATRIC MEDICATIONS  Paxil, Celexa (made her more anxious), Lexapro  Effexor (made her anxiety worse), Cymbalta (as a kid)  Trazodone (tiredness), Doxepin, Clonidine  Propranolol, Hydroxyzine, Buspar, Klonopin     REVIEW OF SYSTEMS:        Constitutional negative   Eyes negative   Ears/Nose/Mouth/Throat negative   Cardiovascular negative   Respiratory negative   Gastrointestinal negative   Genitourinary negative   Muscular negative   Integumentary negative   Neurological negative   Endocrine negative   Hematologic/Lymphatic negative     PHYSICAL EXAMINAION:  Vital signs: There were no vitals taken for this visit.  Musculoskeletal: Normal gait.   Abnormal movements: none      MENTAL STATUS EXAMINATION      General:   - Grooming and hygiene: Casual,   - Apparent distress: tense,   - Behavior: Tense  - Eye Contact:  Good,   - no psychomotor agitation or retardation    - Participation: Active verbal participation  Orientation: Alert and Fully Oriented to person, place and time  Mood: Depressed and Anxious  Affect: Constricted,  Thought Process: Logical and Goal-directed  Thought Content: Denies suicidal or homicidal ideations, intent or plan   Perception: Denies auditory or visual hallucinations. No delusions noted   Attention span and concentration: Intact   Speech:Rate within normal limits and Volume within normal limits  Language: Appropriate   Insight: Good  Judgment: Good  Recent  and remote memory: No gross evidence of memory deficits        DEPRESSION SCREENING:      3/6/2025     2:00 PM   Depression Screen (PHQ-2/PHQ-9)   PHQ-2 Total Score 6   PHQ-9 Total Score 22       Interpretation of PHQ-9 Total Score   Score Severity   1-4 No Depression   5-9 Mild Depression   10-14 Moderate Depression   15-19 Moderately Severe Depression   20-27 Severe Depression    CURRENT RISK:       Suicidal: Low       Homicidal: Low       Self-Harm: Low       Relapse: Low       Crisis Safety Plan Reviewed Not Indicated       If evidence of imminent risk is present, intervention/plan:      MEDICAL RECORDS/LABS/DIAGNOSTIC TESTS REVIEWED:  No new lab since last visit     [place]  records -   Reviewed     (1) SOPHIA; (2) MDD; (3) Chronic insomnia; (4) Nicotine use  Mood & anxiety persisting  Taper Lexapro 10 mg daily X 1 week and stop  Add Cymbalta 30 mg daily.  Stop Clonidine --> Add Ramelteon 8 mg HS PRN for sleep.  Referral placed to sleep medicine for evaluation of chronic insomnia.  Continue Nicotine patch 14 mg/24 hr  Continue psychotherapy  Medication options, alternatives (including no medications) and medication risks/benefits/side effects were discussed in detail.  Explained importance of contraceptive measures while on psychotropic medications, educated to let provider know if ever pregnant or wanting to become pregnant. Verbalized understanding.  The patient was advised to call, message provider on [employer] [facility], or come in to the clinic if symptoms worsen or if any future questions/issues regarding their medications arise; the patient verbalized understanding and agreement.    The patient was educated to call 911, call the suicide hotline, or go to local ER if having thoughts of suicide or homicide; verbalized understanding.      Billing Coding based on:  78340 based on [facility]  46766: based on psychotherapy timing    Return to clinic in 2-3 weeks or sooner if symptoms worsen.  Next Appointment: instruction provided on  how to make the next appointment.     The proposed treatment plan was discussed with the patient who was provided the opportunity to ask questions and make suggestions regarding alternative treatment. Patient verbalized understanding and expressed agreement with the plan.       Julius Abrams M.D.  06/18/25    This note was created using voice recognition software (Dragon). The accuracy of the dictation is limited by the abilities of the software. I have reviewed the note prior to signing, however some errors in grammar and context are still possible. If you have any questions related to this note please do not hesitate to contact our office.            [1]   Current Outpatient Medications   Medication Sig Dispense Refill    escitalopram (LEXAPRO) 20 MG tablet Take 1 Tablet by mouth every day. 90 Tablet 1    cloNIDine (CATAPRES) 0.1 MG Tab Take 1 Tablet by mouth at bedtime. 30 Tablet 2    nicotine (NICODERM) 14 MG/24HR PATCH 24 HR Place 1 Patch on the skin every 24 hours. 90 Patch 1    Ibuprofen (ADVIL) 200 MG CAPS Take  by mouth.      oxycodone-acetaminophen (PERCOCET) 5-325 MG TABS Take 1-2 Tabs by mouth every four hours as needed for Mild Pain. 20 Each 0     No current facility-administered medications for this visit.   [2] No past medical history on file.  [3] No past surgical history on file.

## 2025-06-18 NOTE — PROGRESS NOTES
[facility]   Psychotherapy Progress Note        Name: Mae Wells  MRN: [id number]  : 1990  Age: 34 y.o.  Date of assessment: 2025  PCP: Pcp Pt States None  Persons in attendance: Patient  Total session time: 50 minutes      Topics addressed in psychotherapy include: Mae reports having regrets about not being able to set and hold boundaries in friendships that she didn't want to be more serious than friendships, and concerns that she might do that again in the future with others. We discussed the details of the situation that she was referring to, and identified several strategic actions she could take to enforce boundaries and prevent putting herself in a situation where those boundaries might be violated.     Objective Observations:   Participation:Active verbal participation, Attentive, Engaged, and Open to feedback   Grooming:Casual and Neat   Cognition:Alert and Fully Oriented   Eye Contact:Good   Mood:Euthymic   Affect:Flexible, Expansive, and Congruent with content   Thought Process:Logical and Goal-directed   Speech:Rate within normal limits and Volume within normal limits    Current Risk:   Suicide: low   Homicide: low   Self-Harm: low     Care Plan Updated: Yes    Does patient express agreement with the above plan? Yes     Diagnosis:  1. Severe episode of recurrent major depressive disorder, without psychotic features (HCC)    2. SOPHIA (generalized anxiety disorder)        Referral appointment(s) scheduled? Yes      BEATRICE Owens

## 2025-07-08 ENCOUNTER — APPOINTMENT (OUTPATIENT)
Dept: BEHAVIORAL HEALTH | Facility: CLINIC | Age: 35
End: 2025-07-08
Payer: MEDICAID

## 2025-07-08 NOTE — PROGRESS NOTES
This evaluation was conducted via {platform:41635} using secure and encrypted videoconferencing technology. {Virtual or Telemedicine:41082}      PSYCHIATRY FOLLOW-UP NOTE      Name: Mae Wells  MRN: [id number]  : 1990  Age: 34 y.o.  Date of assessment: 2025  PCP: Pcp Pt States None  Persons in attendance: Patient      REASON FOR VISIT/CHIEF COMPLAINT (as stated by Patient):  Mae Wells is a 34 y.o., White female, attending follow-up appointment for mood and anxiety management.      HISTORY OF PRESENT ILLNESS:  Mae Wells is a 34 y.o. old female with SOPHIA, MDD and insomnia comes in today for follow up. Patient was last seen 3 weeks ago, and following treatment planning recommendations were done:  Taper Lexapro 10 mg daily X 1 week and stop  Add Cymbalta 30 mg daily.  Stop Clonidine --> Add Ramelteon 8 mg HS PRN for sleep.  Referral placed to sleep medicine for evaluation of chronic insomnia.  Continue Nicotine patch 14 mg/24 hr  Continue psychotherapy    History of Present Illness        PSYCHOTHERAPY ASPECT OF SESSION (*** MIN):  ***      CURRENT MEDICATIONS:  Current Medications[1]    MEDICAL HISTORY  Past Medical History[2]  Past Surgical History[3]      PAST PSYCHIATRIC MEDICATIONS  Paxil, Celexa (made her more anxious), Lexapro  Effexor (made her anxiety worse), Cymbalta (as a kid)  Trazodone (tiredness), Doxepin, Clonidine, Ramelteon  Propranolol, Hydroxyzine, Buspar, Klonopin     REVIEW OF SYSTEMS:        Constitutional negative   Eyes negative   Ears/Nose/Mouth/Throat negative   Cardiovascular negative   Respiratory negative   Gastrointestinal negative   Genitourinary negative   Muscular negative   Integumentary negative   Neurological negative   Endocrine negative   Hematologic/Lymphatic negative     PHYSICAL EXAMINAION:  Vital signs: There were no vitals taken for this visit.  Musculoskeletal: Normal gait.   Abnormal movements: ***      MENTAL STATUS EXAMINATION       General:   - Grooming and hygiene: {[facility]},   - Apparent distress: ***,   - Behavior: {[facility] BEHAVIOR:79001371}  - Eye Contact:  {[facility] EYE CONTACT:28584255},   - no psychomotor agitation or retardation ***   - Participation: {[facility] PARTICIPATION MEASURES:83712660}  Orientation: {[facility] :83277999} to person, place and time  Mood: {[facility] MOOD:61006441}  Affect: {[facility] AFFECT:85750206},  Thought Process: {[facility] THOUGHT PROCESS:06507777}  Thought Content: Denies suicidal or homicidal ideations, intent or plan {[facility] THOUGHT CONTENT:93855455}  Perception: Denies auditory or visual hallucinations. No delusions noted {[facility] PERCEPTION:25747661}  Attention span and concentration: Intact ***  Speech:{[facility] SPEECH:09249322}  Language: Appropriate ***  Insight: {GOOD/ADEQUATE/LIMITED/POOR:45503511}  Judgment: {GOOD/ADEQUATE/LIMITED/POOR:71055272}  Recent and remote memory: {[facility] MEMORY:34684419}        DEPRESSION SCREENING:      3/6/2025     2:00 PM   Depression Screen (PHQ-2/PHQ-9)   PHQ-2 Total Score 6   PHQ-9 Total Score 22       Interpretation of PHQ-9 Total Score   Score Severity   1-4 No Depression   5-9 Mild Depression   10-14 Moderate Depression   15-19 Moderately Severe Depression   20-27 Severe Depression    CURRENT RISK:       Suicidal: {[facility] RATINGS:25534042}       Homicidal: {[facility] RATINGS:06773124}       Self-Harm: {[facility] RATINGS:24588389}       Relapse: {[facility] RATINGS:18377500}       Crisis Safety Plan Reviewed {YES/NO/NOT INDICATED:16027}       If evidence of imminent risk is present, intervention/plan:      MEDICAL RECORDS/LABS/DIAGNOSTIC TESTS REVIEWED:  No new lab since last visit     [place]  records -   Reviewed     (1) SOPHIA; (2) MDD; (3) Chronic insomnia; (4) Nicotine use  Mood & anxiety persisting  Taper Lexapro 10 mg daily X 1 week and stop  Add Cymbalta 30 mg daily.  Stop Clonidine --> Add Ramelteon 8 mg HS PRN for sleep.  Referral placed to sleep medicine for evaluation of chronic  insomnia.  Continue Nicotine patch 14 mg/24 hr  Continue psychotherapy  Medication options, alternatives (including no medications) and medication risks/benefits/side effects were discussed in detail.  Explained importance of contraceptive measures while on psychotropic medications, educated to let provider know if ever pregnant or wanting to become pregnant. Verbalized understanding.  The patient was advised to call, message provider on MyChart, or come in to the clinic if symptoms worsen or if any future questions/issues regarding their medications arise; the patient verbalized understanding and agreement.    The patient was educated to call 911, call the suicide hotline, or go to local ER if having thoughts of suicide or homicide; verbalized understanding.      Billing Coding based on:  49934 based on [facility]  66938: based on psychotherapy timing  25885: based on total time spent of 40 min in evaluation, charting and file review.   : based on the medical complexity and need for changes in treatment discussed above    Return to clinic in *** or sooner if symptoms worsen.  Next Appointment: instruction provided on how to make the next appointment.     The proposed treatment plan was discussed with the patient who was provided the opportunity to ask questions and make suggestions regarding alternative treatment. Patient verbalized understanding and expressed agreement with the plan.       Julius Abrams M.D.  07/08/25    This note was created using voice recognition software (Dragon). The accuracy of the dictation is limited by the abilities of the software. I have reviewed the note prior to signing, however some errors in grammar and context are still possible. If you have any questions related to this note please do not hesitate to contact our office.            [1]   Current Outpatient Medications   Medication Sig Dispense Refill    DULoxetine (CYMBALTA) 30 MG Cap DR Particles Take 1 Capsule by mouth every  day. 30 Capsule 1    ramelteon (ROZEREM) 8 MG tablet Take 1 Tablet by mouth every evening. 30 Tablet 6    nicotine (NICODERM) 14 MG/24HR PATCH 24 HR Place 1 Patch on the skin every 24 hours. 90 Patch 1    Ibuprofen (ADVIL) 200 MG CAPS Take  by mouth.      oxycodone-acetaminophen (PERCOCET) 5-325 MG TABS Take 1-2 Tabs by mouth every four hours as needed for Mild Pain. 20 Each 0     No current facility-administered medications for this visit.   [2] No past medical history on file.  [3] No past surgical history on file.

## 2025-07-11 ENCOUNTER — OFFICE VISIT (OUTPATIENT)
Dept: BEHAVIORAL HEALTH | Facility: CLINIC | Age: 35
End: 2025-07-11
Payer: MEDICAID

## 2025-07-11 DIAGNOSIS — F41.1 GAD (GENERALIZED ANXIETY DISORDER): ICD-10-CM

## 2025-07-11 DIAGNOSIS — F33.2 SEVERE EPISODE OF RECURRENT MAJOR DEPRESSIVE DISORDER, WITHOUT PSYCHOTIC FEATURES (HCC): Primary | ICD-10-CM

## 2025-07-11 DIAGNOSIS — F17.200 NICOTINE USE DISORDER: ICD-10-CM

## 2025-07-11 DIAGNOSIS — G47.09 OTHER INSOMNIA: ICD-10-CM

## 2025-07-11 PROCEDURE — 99214 OFFICE O/P EST MOD 30 MIN: CPT | Performed by: PSYCHIATRY & NEUROLOGY

## 2025-07-11 RX ORDER — NICOTINE 21 MG/24HR
1 PATCH, TRANSDERMAL 24 HOURS TRANSDERMAL EVERY 24 HOURS
Qty: 30 PATCH | Refills: 1 | Status: SHIPPED | OUTPATIENT
Start: 2025-07-11

## 2025-07-11 RX ORDER — DULOXETIN HYDROCHLORIDE 60 MG/1
60 CAPSULE, DELAYED RELEASE ORAL DAILY
Qty: 30 CAPSULE | Refills: 1 | Status: SHIPPED | OUTPATIENT
Start: 2025-07-11

## 2025-07-11 NOTE — PROGRESS NOTES
PSYCHIATRY FOLLOW-UP NOTE      Name: Mae Wells  MRN: [id number]  : 1990  Age: 34 y.o.  Date of assessment: 2025  PCP: Pcp Pt States None  Persons in attendance: Patient      REASON FOR VISIT/CHIEF COMPLAINT (as stated by Patient):  Mae Wells is a 34 y.o., White female, attending follow-up appointment for mood and anxiety management.      HISTORY OF PRESENT ILLNESS:  Mae Wells is a 34 y.o. old female with SOPHIA, MDD and insomnia comes in today for follow up. Patient was last seen 3 weeks ago, and following treatment planning recommendations were done:  Taper Lexapro 10 mg daily X 1 week and stop  Add Cymbalta 30 mg daily.  Stop Clonidine --> Add Ramelteon 8 mg HS PRN for sleep.  Referral placed to sleep medicine for evaluation of chronic insomnia.  Continue Nicotine patch 14 mg/24 hr  Continue psychotherapy    History of Present Illness    She has been on Cymbalta for 3 weeks, with the first week being a taper off Lexapro. She reports no adverse reactions from discontinuing Lexapro and has not experienced any negative effects from Cymbalta.   Her depression and anxiety levels remain unchanged.   She believes that his sleep medications, including ramelteon, are exacerbating his depression as they induce drowsiness the following day without improving his sleep quality.   She has failed trial of following for sleep: Trazodone (tiredness), Doxepin, Clonidine, Ramelteon.  Agreed with avoiding new sleep medication and awaiting evaluation by sleep medicine.  She has an upcoming appointment with a sleep specialist on 2025.     She is attempting to quit smoking using a patch, which has led to increased anxiety over the past few days. She has abstained from smoking for 3 days but is experiencing withdrawal symptoms such as stomachaches and headaches. She is maintaining hydration and using chlorophyll and cinnamon to manage cravings. She is requesting an increase in his  nicotine patch dosage.         CURRENT MEDICATIONS:  Current Medications[1]    MEDICAL HISTORY  Past Medical History[2]  Past Surgical History[3]      PAST PSYCHIATRIC MEDICATIONS  Paxil, Celexa (made her more anxious), Lexapro  Effexor (made her anxiety worse), Cymbalta (as a kid)  Trazodone (tiredness), Doxepin, Clonidine, Ramelteon  Propranolol, Hydroxyzine, Buspar, Klonopin     REVIEW OF SYSTEMS:        Constitutional negative   Eyes negative   Ears/Nose/Mouth/Throat negative   Cardiovascular negative   Respiratory negative   Gastrointestinal negative   Genitourinary negative   Muscular negative   Integumentary negative   Neurological negative   Endocrine negative   Hematologic/Lymphatic negative     PHYSICAL EXAMINAION:  Vital signs: There were no vitals taken for this visit.  Musculoskeletal: Normal gait.   Abnormal movements: none      MENTAL STATUS EXAMINATION      General:   - Grooming and hygiene: Casual,   - Apparent distress: tense,   - Behavior: Tense  - Eye Contact:  Good,   - no psychomotor agitation or retardation    - Participation: Active verbal participation  Orientation: Alert and Fully Oriented to person, place and time  Mood: Depressed and Anxious  Affect: Constricted,  Thought Process: Logical and Goal-directed  Thought Content: Denies suicidal or homicidal ideations, intent or plan   Perception: Denies auditory or visual hallucinations. No delusions noted   Attention span and concentration: Intact   Speech:Rate within normal limits and Volume within normal limits  Language: Appropriate   Insight: Good  Judgment: Good  Recent and remote memory: No gross evidence of memory deficits        DEPRESSION SCREENING:      3/6/2025     2:00 PM   Depression Screen (PHQ-2/PHQ-9)   PHQ-2 Total Score 6   PHQ-9 Total Score 22       Interpretation of PHQ-9 Total Score   Score Severity   1-4 No Depression   5-9 Mild Depression   10-14 Moderate Depression   15-19 Moderately Severe Depression   20-27 Severe  Depression    CURRENT RISK:       Suicidal: Low       Homicidal: Low       Self-Harm: Low       Relapse: Low       Crisis Safety Plan Reviewed Not Indicated       If evidence of imminent risk is present, intervention/plan:      MEDICAL RECORDS/LABS/DIAGNOSTIC TESTS REVIEWED:  No new lab since last visit     [place]  records -   Reviewed     (1) SOPHIA; (2) MDD; (3) Chronic insomnia; (4) Nicotine use  Mood & anxiety persisting  Increase Cymbalta 60 mg daily  Stop Ramelteon  Referral placed to sleep medicine for evaluation of chronic insomnia.  Increase Nicotine patch 21 mg/24 hr X 2 weeks --> 14 mg/24 hr  Continue psychotherapy  Medication options, alternatives (including no medications) and medication risks/benefits/side effects were discussed in detail.  Explained importance of contraceptive measures while on psychotropic medications, educated to let provider know if ever pregnant or wanting to become pregnant. Verbalized understanding.  The patient was advised to call, message provider on [facility], or come in to the clinic if symptoms worsen or if any future questions/issues regarding their medications arise; the patient verbalized understanding and agreement.    The patient was educated to call 911, call the suicide hotline, or go to local ER if having thoughts of suicide or homicide; verbalized understanding.      Billing Coding based on:  49546 based on MDM    Return to clinic in 1 month or sooner if symptoms worsen.  Next Appointment: instruction provided on how to make the next appointment.     The proposed treatment plan was discussed with the patient who was provided the opportunity to ask questions and make suggestions regarding alternative treatment. Patient verbalized understanding and expressed agreement with the plan.       Julius Abrams M.D.  07/11/25    This note was created using voice recognition software (Dragon). The accuracy of the dictation is limited by the abilities of the software. I have  reviewed the note prior to signing, however some errors in grammar and context are still possible. If you have any questions related to this note please do not hesitate to contact our office.            [1]   Current Outpatient Medications   Medication Sig Dispense Refill    DULoxetine (CYMBALTA) 30 MG Cap DR Particles Take 1 Capsule by mouth every day. 30 Capsule 1    ramelteon (ROZEREM) 8 MG tablet Take 1 Tablet by mouth every evening. 30 Tablet 6    nicotine (NICODERM) 14 MG/24HR PATCH 24 HR Place 1 Patch on the skin every 24 hours. 90 Patch 1    Ibuprofen (ADVIL) 200 MG CAPS Take  by mouth.      oxycodone-acetaminophen (PERCOCET) 5-325 MG TABS Take 1-2 Tabs by mouth every four hours as needed for Mild Pain. 20 Each 0     No current facility-administered medications for this visit.   [2] No past medical history on file.  [3] No past surgical history on file.

## 2025-07-15 ENCOUNTER — OFFICE VISIT (OUTPATIENT)
Dept: BEHAVIORAL HEALTH | Facility: CLINIC | Age: 35
End: 2025-07-15
Payer: MEDICAID

## 2025-07-15 DIAGNOSIS — F41.1 GAD (GENERALIZED ANXIETY DISORDER): ICD-10-CM

## 2025-07-15 DIAGNOSIS — F33.2 SEVERE EPISODE OF RECURRENT MAJOR DEPRESSIVE DISORDER, WITHOUT PSYCHOTIC FEATURES (HCC): Primary | ICD-10-CM

## 2025-07-15 PROCEDURE — 90834 PSYTX W PT 45 MINUTES: CPT | Performed by: MARRIAGE & FAMILY THERAPIST

## 2025-07-17 NOTE — PROGRESS NOTES
[facility]   Psychotherapy Progress Note        Name: Mae Wells  MRN: [id number]  : 1990  Age: 34 y.o.  Date of assessment: 7/15/2025  PCP: Pcp Pt States None  Persons in attendance: Patient  Total session time: 50 minutes      Topics addressed in psychotherapy include: aMe reports she is one week without a cigarette, and doesn't know if she will be able to continue to abstain from smoking or not. She reports she has had stressful inyteractions withi her mom and her brother, and her brother has always been treated better than her and her brother resents Mae at times because he is the one who has taken care of their mom as needed because he has always been geographically closer to her. Supportive psychotherapy provided. Motivational interviewing conducted to promote abstainance from cigarette smoking.     Objective Observations:   Participation:Active verbal participation, Attentive, Engaged, and Open to feedback   Grooming:Casual and Neat   Cognition:Alert and Fully Oriented   Eye Contact:Good   Mood:Euthymic   Affect:Flexible, Expansive, and Congruent with content   Thought Process:Logical and Goal-directed   Speech:Rate within normal limits and Volume within normal limits    Current Risk:   Suicide: low   Homicide: low   Self-Harm: low     Care Plan Updated: Yes    Does patient express agreement with the above plan? Yes     Diagnosis:  1. Severe episode of recurrent major depressive disorder, without psychotic features (HCC)    2. SOPHIA (generalized anxiety disorder)        Referral appointment(s) scheduled? Yes      SADIQ Owens.

## 2025-07-21 ENCOUNTER — OFFICE VISIT (OUTPATIENT)
Dept: BEHAVIORAL HEALTH | Facility: CLINIC | Age: 35
End: 2025-07-21
Payer: MEDICAID

## 2025-07-21 DIAGNOSIS — F41.1 GAD (GENERALIZED ANXIETY DISORDER): ICD-10-CM

## 2025-07-21 DIAGNOSIS — F33.2 SEVERE EPISODE OF RECURRENT MAJOR DEPRESSIVE DISORDER, WITHOUT PSYCHOTIC FEATURES (HCC): Primary | ICD-10-CM

## 2025-07-21 PROCEDURE — 90834 PSYTX W PT 45 MINUTES: CPT | Performed by: MARRIAGE & FAMILY THERAPIST

## 2025-07-23 NOTE — PROGRESS NOTES
[facility]   Psychotherapy Progress Note        Name: Mae Wells  MRN: [id number]  : 1990  Age: 34 y.o.  Date of assessment: 2025  PCP: Pcp Pt States None  Persons in attendance: Patient  Total session time: 50 minutes      Topics addressed in psychotherapy include: Mae reports she was stressed out yesterday due to a toxic ex contacting her after 3 years to make emotionally manipulative comments into guilting her for his addiction and mental health issues. Intervention: We discussed ways for Mae to set and enforce boundaries with toxic people in her life, and validated her ability to recognize when others are trying to guilt or manipulate her.     Objective Observations:   Participation:Active verbal participation, Attentive, Engaged, and Open to feedback   Grooming:Casual and Neat   Cognition:Alert and Fully Oriented   Eye Contact:Good   Mood:Euthymic   Affect:Flexible, Expansive, and Congruent with content   Thought Process:Logical and Goal-directed   Speech:Rate within normal limits and Volume within normal limits    Current Risk:   Suicide: low   Homicide: low   Self-Harm: low     Care Plan Updated: Yes    Does patient express agreement with the above plan? Yes     Diagnosis:  1. Severe episode of recurrent major depressive disorder, without psychotic features (HCC)    2. SOPHIA (generalized anxiety disorder)        Referral appointment(s) scheduled? Yes       SADIQ Owens.

## 2025-07-29 ENCOUNTER — APPOINTMENT (OUTPATIENT)
Dept: BEHAVIORAL HEALTH | Facility: CLINIC | Age: 35
End: 2025-07-29
Payer: MEDICAID